# Patient Record
Sex: FEMALE | Race: WHITE | Employment: UNEMPLOYED | URBAN - METROPOLITAN AREA
[De-identification: names, ages, dates, MRNs, and addresses within clinical notes are randomized per-mention and may not be internally consistent; named-entity substitution may affect disease eponyms.]

---

## 2017-11-07 ENCOUNTER — APPOINTMENT (OUTPATIENT)
Dept: CT IMAGING | Age: 27
End: 2017-11-07
Attending: NURSE PRACTITIONER
Payer: MEDICAID

## 2017-11-07 ENCOUNTER — HOSPITAL ENCOUNTER (EMERGENCY)
Age: 27
Discharge: HOME | End: 2017-11-07
Attending: EMERGENCY MEDICINE | Admitting: EMERGENCY MEDICINE
Payer: MEDICAID

## 2017-11-07 VITALS
RESPIRATION RATE: 16 BRPM | OXYGEN SATURATION: 98 % | TEMPERATURE: 98 F | WEIGHT: 161.6 LBS | HEART RATE: 77 BPM | SYSTOLIC BLOOD PRESSURE: 117 MMHG | BODY MASS INDEX: 26.92 KG/M2 | HEIGHT: 65 IN | DIASTOLIC BLOOD PRESSURE: 77 MMHG

## 2017-11-07 DIAGNOSIS — K52.9 GASTROENTERITIS: Primary | ICD-10-CM

## 2017-11-07 LAB
ALBUMIN SERPL-MCNC: 4.2 G/DL (ref 3.5–5)
ALBUMIN/GLOB SERPL: 0.9 {RATIO} (ref 1.1–2.2)
ALP SERPL-CCNC: 88 U/L (ref 45–117)
ALT SERPL-CCNC: 72 U/L (ref 12–78)
ANION GAP SERPL CALC-SCNC: 9 MMOL/L (ref 5–15)
APPEARANCE UR: ABNORMAL
AST SERPL-CCNC: 30 U/L (ref 15–37)
BACTERIA URNS QL MICRO: ABNORMAL /HPF
BASOPHILS # BLD: 0 K/UL (ref 0–0.1)
BASOPHILS NFR BLD: 0 % (ref 0–1)
BILIRUB SERPL-MCNC: 0.6 MG/DL (ref 0.2–1)
BILIRUB UR QL: NEGATIVE
BUN SERPL-MCNC: 15 MG/DL (ref 6–20)
BUN/CREAT SERPL: 18 (ref 12–20)
CALCIUM SERPL-MCNC: 9.1 MG/DL (ref 8.5–10.1)
CHLORIDE SERPL-SCNC: 103 MMOL/L (ref 97–108)
CO2 SERPL-SCNC: 23 MMOL/L (ref 21–32)
COLOR UR: ABNORMAL
CREAT SERPL-MCNC: 0.83 MG/DL (ref 0.55–1.02)
EOSINOPHIL # BLD: 0.1 K/UL (ref 0–0.4)
EOSINOPHIL NFR BLD: 1 % (ref 0–7)
EPITH CASTS URNS QL MICRO: ABNORMAL /LPF
ERYTHROCYTE [DISTWIDTH] IN BLOOD BY AUTOMATED COUNT: 12.7 % (ref 11.5–14.5)
GLOBULIN SER CALC-MCNC: 4.8 G/DL (ref 2–4)
GLUCOSE SERPL-MCNC: 94 MG/DL (ref 65–100)
GLUCOSE UR STRIP.AUTO-MCNC: NEGATIVE MG/DL
HCG UR QL: NEGATIVE
HCT VFR BLD AUTO: 47.1 % (ref 35–47)
HGB BLD-MCNC: 16.3 G/DL (ref 11.5–16)
HGB UR QL STRIP: NEGATIVE
HYALINE CASTS URNS QL MICRO: ABNORMAL /LPF (ref 0–5)
KETONES UR QL STRIP.AUTO: NEGATIVE MG/DL
LEUKOCYTE ESTERASE UR QL STRIP.AUTO: ABNORMAL
LIPASE SERPL-CCNC: 95 U/L (ref 73–393)
LYMPHOCYTES # BLD: 1.8 K/UL (ref 0.8–3.5)
LYMPHOCYTES NFR BLD: 11 % (ref 12–49)
MCH RBC QN AUTO: 29.2 PG (ref 26–34)
MCHC RBC AUTO-ENTMCNC: 34.6 G/DL (ref 30–36.5)
MCV RBC AUTO: 84.4 FL (ref 80–99)
MONOCYTES # BLD: 0.6 K/UL (ref 0–1)
MONOCYTES NFR BLD: 4 % (ref 5–13)
NEUTS SEG # BLD: 13.4 K/UL (ref 1.8–8)
NEUTS SEG NFR BLD: 84 % (ref 32–75)
NITRITE UR QL STRIP.AUTO: NEGATIVE
PH UR STRIP: 5 [PH] (ref 5–8)
PLATELET # BLD AUTO: 324 K/UL (ref 150–400)
POTASSIUM SERPL-SCNC: 4.3 MMOL/L (ref 3.5–5.1)
PROT SERPL-MCNC: 9 G/DL (ref 6.4–8.2)
PROT UR STRIP-MCNC: NEGATIVE MG/DL
RBC # BLD AUTO: 5.58 M/UL (ref 3.8–5.2)
RBC #/AREA URNS HPF: ABNORMAL /HPF (ref 0–5)
SODIUM SERPL-SCNC: 135 MMOL/L (ref 136–145)
SP GR UR REFRACTOMETRY: 1.02 (ref 1–1.03)
T4 FREE SERPL-MCNC: 1 NG/DL (ref 0.8–1.5)
TSH SERPL DL<=0.05 MIU/L-ACNC: 2.94 UIU/ML (ref 0.36–3.74)
UR CULT HOLD, URHOLD: NORMAL
UROBILINOGEN UR QL STRIP.AUTO: 0.2 EU/DL (ref 0.2–1)
WBC # BLD AUTO: 15.9 K/UL (ref 3.6–11)
WBC URNS QL MICRO: ABNORMAL /HPF (ref 0–4)

## 2017-11-07 PROCEDURE — 84443 ASSAY THYROID STIM HORMONE: CPT | Performed by: NURSE PRACTITIONER

## 2017-11-07 PROCEDURE — 36415 COLL VENOUS BLD VENIPUNCTURE: CPT | Performed by: EMERGENCY MEDICINE

## 2017-11-07 PROCEDURE — 99283 EMERGENCY DEPT VISIT LOW MDM: CPT

## 2017-11-07 PROCEDURE — 74011636320 HC RX REV CODE- 636/320: Performed by: EMERGENCY MEDICINE

## 2017-11-07 PROCEDURE — 85025 COMPLETE CBC W/AUTO DIFF WBC: CPT | Performed by: EMERGENCY MEDICINE

## 2017-11-07 PROCEDURE — 80053 COMPREHEN METABOLIC PANEL: CPT | Performed by: EMERGENCY MEDICINE

## 2017-11-07 PROCEDURE — 81001 URINALYSIS AUTO W/SCOPE: CPT | Performed by: EMERGENCY MEDICINE

## 2017-11-07 PROCEDURE — 84439 ASSAY OF FREE THYROXINE: CPT | Performed by: NURSE PRACTITIONER

## 2017-11-07 PROCEDURE — 74011250636 HC RX REV CODE- 250/636: Performed by: NURSE PRACTITIONER

## 2017-11-07 PROCEDURE — 83690 ASSAY OF LIPASE: CPT | Performed by: EMERGENCY MEDICINE

## 2017-11-07 PROCEDURE — 74177 CT ABD & PELVIS W/CONTRAST: CPT

## 2017-11-07 PROCEDURE — 96372 THER/PROPH/DIAG INJ SC/IM: CPT

## 2017-11-07 PROCEDURE — 96361 HYDRATE IV INFUSION ADD-ON: CPT

## 2017-11-07 PROCEDURE — 74011000258 HC RX REV CODE- 258: Performed by: EMERGENCY MEDICINE

## 2017-11-07 PROCEDURE — 81025 URINE PREGNANCY TEST: CPT

## 2017-11-07 PROCEDURE — 96360 HYDRATION IV INFUSION INIT: CPT

## 2017-11-07 RX ORDER — SODIUM CHLORIDE 0.9 % (FLUSH) 0.9 %
10 SYRINGE (ML) INJECTION
Status: COMPLETED | OUTPATIENT
Start: 2017-11-07 | End: 2017-11-07

## 2017-11-07 RX ORDER — DICYCLOMINE HYDROCHLORIDE 10 MG/ML
20 INJECTION INTRAMUSCULAR
Status: COMPLETED | OUTPATIENT
Start: 2017-11-07 | End: 2017-11-07

## 2017-11-07 RX ORDER — DICYCLOMINE HYDROCHLORIDE 10 MG/1
10 CAPSULE ORAL 4 TIMES DAILY
Qty: 20 CAP | Refills: 0 | Status: SHIPPED | OUTPATIENT
Start: 2017-11-07 | End: 2017-11-12

## 2017-11-07 RX ORDER — AMITRIPTYLINE HYDROCHLORIDE 25 MG/1
25 TABLET, FILM COATED ORAL
COMMUNITY
End: 2019-01-08

## 2017-11-07 RX ORDER — ESCITALOPRAM OXALATE 20 MG/1
20 TABLET ORAL DAILY
COMMUNITY
End: 2019-01-08

## 2017-11-07 RX ORDER — ONDANSETRON 4 MG/1
4 TABLET, ORALLY DISINTEGRATING ORAL
Qty: 20 TAB | Refills: 0 | Status: SHIPPED | OUTPATIENT
Start: 2017-11-07 | End: 2019-01-08

## 2017-11-07 RX ADMIN — SODIUM CHLORIDE 100 ML: 900 INJECTION, SOLUTION INTRAVENOUS at 14:58

## 2017-11-07 RX ADMIN — SODIUM CHLORIDE 1000 ML: 900 INJECTION, SOLUTION INTRAVENOUS at 13:47

## 2017-11-07 RX ADMIN — IOPAMIDOL 100 ML: 755 INJECTION, SOLUTION INTRAVENOUS at 14:58

## 2017-11-07 RX ADMIN — SODIUM CHLORIDE 1000 ML: 900 INJECTION, SOLUTION INTRAVENOUS at 15:32

## 2017-11-07 RX ADMIN — Medication 10 ML: at 14:58

## 2017-11-07 RX ADMIN — DICYCLOMINE HYDROCHLORIDE 20 MG: 20 INJECTION, SOLUTION INTRAMUSCULAR at 13:48

## 2017-11-07 NOTE — ED TRIAGE NOTES
Pt. complains of abd. pain n/v. The first episode of this was about 3 weeks ago. Also complains of diarrhea. Has had blood work drawn by MD, has not had a sit down to discuss workup but was led to believe that she has hypothyroidism, tx for this has not started yet. Also states 2 weeks ago having fever and sore throat, was not seen or treated.

## 2017-11-07 NOTE — ED PROVIDER NOTES
HPI Comments: The patient is a 30-year-old female who presents ambulatory to the emergency with complaint of recurrent daily severe abdominal pain with associated nausea vomiting and diarrhea for the last 3 weeks. Symptoms have been most severe. Patient reports a 10-15 pound weight loss. 2 weeks ago she elicits a fever for several days with a MAXIMUM TEMPERATURE of 102. No positive sick contacts. No consumption of contaminated food. No history of abdominal or GI complications in the past. Seen by PCP and diagnosed with GERD, prescribed a PPI without improvement. Pt denies fevers, chills, night sweats, chest pain, pressure, SOB, AU, PND, orthopnea, melena, hematuria, dysuria, constipation, HA, dizziness, and syncope. No vaginal discharge, exposure to STI, or new sexual partner. Past Medical History:  No date: Hypothyroid  No date: Psychiatric disorder      Comment: depression, anxiety    Past Surgical History:  No date: HX BREAST AUGMENTATION  No date: HX GYN      Comment:   No date: HX LEEP PROCEDURE    PCP:  Yelena Gonzalez MD          Patient is a 28 y.o. female presenting with abdominal pain and vomiting. The history is provided by the patient. Abdominal Pain    This is a new problem. Episode onset: three weeks ago  The problem occurs daily. The problem has been gradually worsening. The pain is located in the periumbilical region. The pain is at a severity of 6/10. The pain is moderate. Associated symptoms include diarrhea, nausea and vomiting. Pertinent negatives include no fever, no constipation, no dysuria, no frequency, no hematuria, no headaches, no arthralgias, no myalgias, no chest pain and no back pain. Vomiting    Associated symptoms include abdominal pain and diarrhea. Pertinent negatives include no chills, no fever, no headaches, no arthralgias, no myalgias, no cough and no headaches.         Past Medical History:   Diagnosis Date    Hypothyroid     Psychiatric disorder     depression, anxiety       Past Surgical History:   Procedure Laterality Date    HX BREAST AUGMENTATION      HX GYN          HX LEEP PROCEDURE           History reviewed. No pertinent family history. Social History     Social History    Marital status: SINGLE     Spouse name: N/A    Number of children: N/A    Years of education: N/A     Occupational History    Not on file. Social History Main Topics    Smoking status: Former Smoker    Smokeless tobacco: Never Used    Alcohol use Yes    Drug use: No    Sexual activity: Not on file     Other Topics Concern    Not on file     Social History Narrative    No narrative on file         ALLERGIES: Review of patient's allergies indicates no known allergies. Review of Systems   Constitutional: Negative for activity change, appetite change, chills, diaphoresis, fatigue, fever and unexpected weight change. HENT: Negative for congestion, ear pain, rhinorrhea, sinus pressure, sore throat and tinnitus. Eyes: Negative for photophobia, pain, discharge and visual disturbance. Respiratory: Negative for apnea, cough, choking, chest tightness, shortness of breath, wheezing and stridor. Cardiovascular: Negative for chest pain, palpitations and leg swelling. Gastrointestinal: Positive for abdominal pain, diarrhea, nausea and vomiting. Negative for constipation. Endocrine: Negative for polydipsia, polyphagia and polyuria. Genitourinary: Negative for decreased urine volume, dyspareunia, dysuria, enuresis, flank pain, frequency, hematuria and urgency. Musculoskeletal: Negative for arthralgias, back pain, gait problem, myalgias and neck pain. Skin: Negative for color change, pallor, rash and wound. Allergic/Immunologic: Negative for immunocompromised state. Neurological: Negative for dizziness, seizures, syncope, weakness, light-headedness and headaches. Hematological: Does not bruise/bleed easily.    Psychiatric/Behavioral: Negative for agitation and confusion. The patient is not nervous/anxious. Vitals:    11/07/17 1310   BP: 106/76   Pulse: (!) 113   Resp: 18   Temp: 98.2 °F (36.8 °C)   SpO2: 97%   Weight: 73.3 kg (161 lb 9.6 oz)   Height: 5' 5\" (1.651 m)            Physical Exam   Constitutional: She is oriented to person, place, and time. She appears well-developed and well-nourished. No distress. HENT:   Head: Normocephalic. Right Ear: External ear normal.   Left Ear: External ear normal.   Mouth/Throat: Oropharynx is clear and moist. No oropharyngeal exudate. Eyes: Conjunctivae and EOM are normal. Pupils are equal, round, and reactive to light. Right eye exhibits no discharge. Left eye exhibits no discharge. No scleral icterus. Neck: Normal range of motion. Neck supple. No JVD present. No tracheal deviation present. No thyromegaly present. Cardiovascular: Normal rate, regular rhythm, normal heart sounds, intact distal pulses and normal pulses. Exam reveals no gallop and no friction rub. No murmur heard. Pulmonary/Chest: Effort normal and breath sounds normal. No accessory muscle usage or stridor. No respiratory distress. She has no decreased breath sounds. She has no wheezes. She has no rhonchi. She has no rales. She exhibits no tenderness. Abdominal: Soft. Bowel sounds are normal. She exhibits no distension and no mass. There is no hepatosplenomegaly. There is tenderness in the periumbilical area. There is no rigidity, no rebound, no guarding, no CVA tenderness, no tenderness at McBurney's point and negative Mcelroy's sign. Musculoskeletal: Normal range of motion. She exhibits no edema or tenderness. Lymphadenopathy:     She has no cervical adenopathy. Neurological: She is alert and oriented to person, place, and time. She displays normal reflexes. No cranial nerve deficit or sensory deficit. GCS eye subscore is 4. GCS verbal subscore is 5. GCS motor subscore is 6. Skin: Skin is warm and dry. No rash noted.  She is not diaphoretic. No erythema. No pallor. Psychiatric: She has a normal mood and affect. Her behavior is normal. Judgment and thought content normal.   Nursing note and vitals reviewed. MDM  Number of Diagnoses or Management Options  Diagnosis management comments:    * routine laboratory data and UA   * IVF and bentyl   * CT abd pelvis       Amount and/or Complexity of Data Reviewed  Clinical lab tests: ordered and reviewed  Tests in the radiology section of CPT®: ordered and reviewed  Discussion of test results with the performing providers: yes  Review and summarize past medical records: yes  Discuss the patient with other providers: yes    Risk of Complications, Morbidity, and/or Mortality  General comments:    - stable, ambulatory pt in NAD    Patient Progress  Patient progress: stable    ED Course       Procedures           4:15 PM  Pt has been reevaluated. There are no new complaints, changes, or physical findings at this time. Medications have been reviewed w/ pt and/or family. Pt and/or family's questions have been answered. Pt and/or family expressed good understanding of the dx/tx/rx and is in agreement with plan of care. Pt instructed and agreed to f/u w/ PCP and GI and to return to ED upon further deterioration. Pt is ready for discharge. LABORATORY TESTS:  Recent Results (from the past 12 hour(s))   CBC WITH AUTOMATED DIFF    Collection Time: 11/07/17  1:17 PM   Result Value Ref Range    WBC 15.9 (H) 3.6 - 11.0 K/uL    RBC 5.58 (H) 3.80 - 5.20 M/uL    HGB 16.3 (H) 11.5 - 16.0 g/dL    HCT 47.1 (H) 35.0 - 47.0 %    MCV 84.4 80.0 - 99.0 FL    MCH 29.2 26.0 - 34.0 PG    MCHC 34.6 30.0 - 36.5 g/dL    RDW 12.7 11.5 - 14.5 %    PLATELET 261 274 - 273 K/uL    NEUTROPHILS 84 (H) 32 - 75 %    LYMPHOCYTES 11 (L) 12 - 49 %    MONOCYTES 4 (L) 5 - 13 %    EOSINOPHILS 1 0 - 7 %    BASOPHILS 0 0 - 1 %    ABS. NEUTROPHILS 13.4 (H) 1.8 - 8.0 K/UL    ABS. LYMPHOCYTES 1.8 0.8 - 3.5 K/UL    ABS.  MONOCYTES 0.6 0.0 - 1.0 K/UL    ABS. EOSINOPHILS 0.1 0.0 - 0.4 K/UL    ABS. BASOPHILS 0.0 0.0 - 0.1 K/UL   METABOLIC PANEL, COMPREHENSIVE    Collection Time: 11/07/17  1:17 PM   Result Value Ref Range    Sodium 135 (L) 136 - 145 mmol/L    Potassium 4.3 3.5 - 5.1 mmol/L    Chloride 103 97 - 108 mmol/L    CO2 23 21 - 32 mmol/L    Anion gap 9 5 - 15 mmol/L    Glucose 94 65 - 100 mg/dL    BUN 15 6 - 20 MG/DL    Creatinine 0.83 0.55 - 1.02 MG/DL    BUN/Creatinine ratio 18 12 - 20      GFR est AA >60 >60 ml/min/1.73m2    GFR est non-AA >60 >60 ml/min/1.73m2    Calcium 9.1 8.5 - 10.1 MG/DL    Bilirubin, total 0.6 0.2 - 1.0 MG/DL    ALT (SGPT) 72 12 - 78 U/L    AST (SGOT) 30 15 - 37 U/L    Alk.  phosphatase 88 45 - 117 U/L    Protein, total 9.0 (H) 6.4 - 8.2 g/dL    Albumin 4.2 3.5 - 5.0 g/dL    Globulin 4.8 (H) 2.0 - 4.0 g/dL    A-G Ratio 0.9 (L) 1.1 - 2.2     LIPASE    Collection Time: 11/07/17  1:17 PM   Result Value Ref Range    Lipase 95 73 - 393 U/L   TSH 3RD GENERATION    Collection Time: 11/07/17  1:17 PM   Result Value Ref Range    TSH 2.94 0.36 - 3.74 uIU/mL   T4, FREE    Collection Time: 11/07/17  1:17 PM   Result Value Ref Range    T4, Free 1.0 0.8 - 1.5 NG/DL   URINALYSIS W/MICROSCOPIC    Collection Time: 11/07/17  1:25 PM   Result Value Ref Range    Color YELLOW/STRAW      Appearance CLOUDY (A) CLEAR      Specific gravity 1.025 1.003 - 1.030      pH (UA) 5.0 5.0 - 8.0      Protein NEGATIVE  NEG mg/dL    Glucose NEGATIVE  NEG mg/dL    Ketone NEGATIVE  NEG mg/dL    Bilirubin NEGATIVE  NEG      Blood NEGATIVE  NEG      Urobilinogen 0.2 0.2 - 1.0 EU/dL    Nitrites NEGATIVE  NEG      Leukocyte Esterase TRACE (A) NEG      WBC 5-10 0 - 4 /hpf    RBC 5-10 0 - 5 /hpf    Epithelial cells MODERATE (A) FEW /lpf    Bacteria 1+ (A) NEG /hpf    Hyaline cast 2-5 0 - 5 /lpf   URINE CULTURE HOLD SAMPLE    Collection Time: 11/07/17  1:25 PM   Result Value Ref Range    Urine culture hold URINE ON HOLD IN MICROBIOLOGY DEPT FOR 3 DAYS     HCG URINE, QL. - POC    Collection Time: 11/07/17  1:31 PM   Result Value Ref Range    Pregnancy test,urine (POC) NEGATIVE  NEG         IMAGING RESULTS:  CT ABD PELV W CONT   Final Result        Ct Abd Pelv W Cont    Result Date: 11/7/2017  EXAM:  CT ABD PELV W CONT INDICATION: 3 week history of abdominal pain, nausea and vomiting. Also has diarrhea. Reports 10-50 pound weight loss. Several day history of fever with maximum temperature 102. COMPARISON: None CONTRAST:  100 mL of Isovue-370. TECHNIQUE: Following the uneventful intravenous administration of contrast, thin axial images were obtained through the abdomen and pelvis. Coronal and sagittal reconstructions were generated. Oral contrast was not, per order of the ordering physician, administered. CT dose reduction was achieved through use of a standardized protocol tailored for this examination and automatic exposure control for dose modulation. The lack of oral contrast material diminishes the capacity of CT to evaluate abnormalities of the bowel. FINDINGS: LUNG BASES: Clear. INCIDENTALLY IMAGED HEART AND MEDIASTINUM: Unremarkable. LIVER: Hepatic steatosis. No hepatic mass or biliary ductal dilation. GALLBLADDER: Unremarkable. SPLEEN: No mass. PANCREAS: No mass or ductal dilatation. ADRENALS: Unremarkable. KIDNEYS: No mass, calculus, or hydronephrosis. STOMACH: Unremarkable. SMALL BOWEL: No dilatation or wall thickening. COLON: No dilatation or wall thickening. APPENDIX: Unremarkable. PERITONEUM: No ascites or pneumoperitoneum. RETROPERITONEUM: No lymphadenopathy or aortic aneurysm. REPRODUCTIVE ORGANS: Unremarkable. URINARY BLADDER: No mass or calculus. BONES: No destructive bone lesion. ADDITIONAL COMMENTS: N/A     IMPRESSION: No acute findings. Hepatic steatosis.          MEDICATIONS GIVEN:  Medications   sodium chloride 0.9 % bolus infusion 1,000 mL (0 mL IntraVENous IV Completed 11/7/17 7422)   dicyclomine (BENTYL) 10 mg/mL injection 20 mg (20 mg IntraMUSCular Given 11/7/17 1348)   sodium chloride 0.9 % bolus infusion 100 mL (0 mL IntraVENous IV Completed 11/7/17 1532)   iopamidol (ISOVUE-370) 76 % injection 100 mL (100 mL IntraVENous Given 11/7/17 1458)   sodium chloride (NS) flush 10 mL (10 mL IntraVENous Given 11/7/17 1458)   sodium chloride 0.9 % bolus infusion 1,000 mL (0 mL IntraVENous IV Completed 11/7/17 1607)       IMPRESSION:  1. Gastroenteritis        PLAN:  1. Discharge Medication List as of 11/7/2017  4:01 PM      START taking these medications    Details   ondansetron (ZOFRAN ODT) 4 mg disintegrating tablet Take 1 Tab by mouth every eight (8) hours as needed for Nausea. , Print, Disp-20 Tab, R-0      dicyclomine (BENTYL) 10 mg capsule Take 1 Cap by mouth four (4) times daily for 5 days. , Print, Disp-20 Cap, R-0         CONTINUE these medications which have NOT CHANGED    Details   escitalopram oxalate (LEXAPRO) 20 mg tablet Take 20 mg by mouth daily. , Historical Med      amitriptyline (ELAVIL) 25 mg tablet Take 25 mg by mouth nightly., Historical Med           2. Follow-up Information     Follow up With Details Comments [address], [place] In 2 days  [facility]  [address] [place] [facility]  As needed, If symptoms worsen [facility]  [phone]        3.  Supportive care     Return to ED if worse       Natasha Jordan NP  4:15 PM

## 2017-11-07 NOTE — DISCHARGE INSTRUCTIONS
Gastroenteritis: Care Instructions  Your Care Instructions    Gastroenteritis is an illness that may cause nausea, vomiting, and diarrhea. It is sometimes called \"stomach flu. \" It can be caused by bacteria or a virus. You will probably begin to feel better in 1 to 2 days. In the meantime, get plenty of rest and make sure you do not become dehydrated. Dehydration occurs when your body loses too much fluid. Follow-up care is a key part of your treatment and safety. Be sure to make and go to all appointments, and call your doctor if you are having problems. It's also a good idea to know your test results and keep a list of the medicines you take. How can you care for yourself at home? · If your doctor prescribed antibiotics, take them as directed. Do not stop taking them just because you feel better. You need to take the full course of antibiotics. · Drink plenty of fluids to prevent dehydration, enough so that your urine is light yellow or clear like water. Choose water and other caffeine-free clear liquids until you feel better. If you have kidney, heart, or liver disease and have to limit fluids, talk with your doctor before you increase your fluid intake. · Drink fluids slowly, in frequent, small amounts, because drinking too much too fast can cause vomiting. · Begin eating mild foods, such as dry toast, yogurt, applesauce, bananas, and rice. Avoid spicy, hot, or high-fat foods, and do not drink alcohol or caffeine for a day or two. Do not drink milk or eat ice cream until you are feeling better. How to prevent gastroenteritis  · Keep hot foods hot and cold foods cold. · Do not eat meats, dressings, salads, or other foods that have been kept at room temperature for more than 2 hours. · Use a thermometer to check your refrigerator. It should be between 34°F and 40°F.  · Defrost meats in the refrigerator or microwave, not on the kitchen counter. · Keep your hands and your kitchen clean.  Wash your hands, cutting boards, and countertops with hot soapy water frequently. · Cook meat until it is well done. · Do not eat raw eggs or uncooked sauces made with raw eggs. · Do not take chances. If food looks or tastes spoiled, throw it out. When should you call for help? Call 911 anytime you think you may need emergency care. For example, call if:  ? · You vomit blood or what looks like coffee grounds. ? · You passed out (lost consciousness). ? · You pass maroon or very bloody stools. ?Call your doctor now or seek immediate medical care if:  ? · You have severe belly pain. ? · You have signs of needing more fluids. You have sunken eyes, a dry mouth, and pass only a little dark urine. ? · You feel like you are going to faint. ? · You have increased belly pain that does not go away in 1 to 2 days. ? · You have new or increased nausea, or you are vomiting. ? · You have a new or higher fever. ? · Your stools are black and tarlike or have streaks of blood. ? Watch closely for changes in your health, and be sure to contact your doctor if:  ? · You are dizzy or lightheaded. ? · You urinate less than usual, or your urine is dark yellow or brown. ? · You do not feel better with each day that goes by. Where can you learn more? Go to http://bunny-aldo.info/. Enter N142 in the search box to learn more about \"Gastroenteritis: Care Instructions. \"  Current as of: March 3, 2017  Content Version: 11.4  © 5450-1180 [employer]. Care instructions adapted under license by [employer] (which disclaims liability or warranty for this information). If you have questions about a medical condition or this instruction, always ask your healthcare professional. [address] any warranty or liability for your use of this information. We hope that we have addressed all of your medical concerns.  The examination and treatment you received in the Emergency Department were for an emergent problem and were not intended as complete care. It is important that you follow up with your healthcare provider(s) for ongoing care. If your symptoms worsen or do not improve as expected, and you are unable to reach your usual health care provider(s), you should return to the Emergency Department. Today's healthcare is undergoing tremendous change, and patient satisfaction surveys are one of the many tools to assess the quality of medical care. You may receive a survey from the [employer] regarding your experience in the Emergency Department. I hope that your experience has been completely positive, particularly the medical care that I provided. As such, please participate in the survey; anything less than excellent does not meet my expectations or intentions. [facility] and [facility] participate in nationally recognized quality of care measures. If your blood pressure is greater than 120/80, as reported below, we urge that you seek medical care to address the potential of high blood pressure, commonly known as hypertension. Hypertension can be hereditary or can be caused by certain medical conditions, pain, stress, or \"white coat syndrome. \"       Please make an appointment with your health care provider(s) for follow up of your Emergency Department visit. VITALS:   Patient Vitals for the past 8 hrs:   Temp Pulse Resp BP SpO2   11/07/17 1310 98.2 °F (36.8 °C) (!) 113 18 106/76 97 %          Thank you for allowing us to provide you with medical care today. We realize that you have many choices for your emergency care needs. Please choose us in the future for any continued health care needs. [facility], [employer].   Office: [phone]            Recent Results (from the past 24 hour(s))   CBC WITH AUTOMATED DIFF    Collection Time: 11/07/17  1:17 PM   Result Value Ref Range    WBC 15.9 (H) 3.6 - 11.0 K/uL    RBC 5.58 (H) 3.80 - 5.20 M/uL    HGB 16.3 (H) 11.5 - 16.0 g/dL    HCT 47.1 (H) 35.0 - 47.0 %    MCV 84.4 80.0 - 99.0 FL    MCH 29.2 26.0 - 34.0 PG    MCHC 34.6 30.0 - 36.5 g/dL    RDW 12.7 11.5 - 14.5 %    PLATELET 144 596 - 697 K/uL    NEUTROPHILS 84 (H) 32 - 75 %    LYMPHOCYTES 11 (L) 12 - 49 %    MONOCYTES 4 (L) 5 - 13 %    EOSINOPHILS 1 0 - 7 %    BASOPHILS 0 0 - 1 %    ABS. NEUTROPHILS 13.4 (H) 1.8 - 8.0 K/UL    ABS. LYMPHOCYTES 1.8 0.8 - 3.5 K/UL    ABS. MONOCYTES 0.6 0.0 - 1.0 K/UL    ABS. EOSINOPHILS 0.1 0.0 - 0.4 K/UL    ABS. BASOPHILS 0.0 0.0 - 0.1 K/UL   METABOLIC PANEL, COMPREHENSIVE    Collection Time: 11/07/17  1:17 PM   Result Value Ref Range    Sodium 135 (L) 136 - 145 mmol/L    Potassium 4.3 3.5 - 5.1 mmol/L    Chloride 103 97 - 108 mmol/L    CO2 23 21 - 32 mmol/L    Anion gap 9 5 - 15 mmol/L    Glucose 94 65 - 100 mg/dL    BUN 15 6 - 20 MG/DL    Creatinine 0.83 0.55 - 1.02 MG/DL    BUN/Creatinine ratio 18 12 - 20      GFR est AA >60 >60 ml/min/1.73m2    GFR est non-AA >60 >60 ml/min/1.73m2    Calcium 9.1 8.5 - 10.1 MG/DL    Bilirubin, total 0.6 0.2 - 1.0 MG/DL    ALT (SGPT) 72 12 - 78 U/L    AST (SGOT) 30 15 - 37 U/L    Alk.  phosphatase 88 45 - 117 U/L    Protein, total 9.0 (H) 6.4 - 8.2 g/dL    Albumin 4.2 3.5 - 5.0 g/dL    Globulin 4.8 (H) 2.0 - 4.0 g/dL    A-G Ratio 0.9 (L) 1.1 - 2.2     LIPASE    Collection Time: 11/07/17  1:17 PM   Result Value Ref Range    Lipase 95 73 - 393 U/L   TSH 3RD GENERATION    Collection Time: 11/07/17  1:17 PM   Result Value Ref Range    TSH 2.94 0.36 - 3.74 uIU/mL   T4, FREE    Collection Time: 11/07/17  1:17 PM   Result Value Ref Range    T4, Free 1.0 0.8 - 1.5 NG/DL   URINALYSIS W/MICROSCOPIC    Collection Time: 11/07/17  1:25 PM   Result Value Ref Range    Color YELLOW/STRAW      Appearance CLOUDY (A) CLEAR      Specific gravity 1.025 1.003 - 1.030      pH (UA) 5.0 5.0 - 8.0 Protein NEGATIVE  NEG mg/dL    Glucose NEGATIVE  NEG mg/dL    Ketone NEGATIVE  NEG mg/dL    Bilirubin NEGATIVE  NEG      Blood NEGATIVE  NEG      Urobilinogen 0.2 0.2 - 1.0 EU/dL    Nitrites NEGATIVE  NEG      Leukocyte Esterase TRACE (A) NEG      WBC 5-10 0 - 4 /hpf    RBC 5-10 0 - 5 /hpf    Epithelial cells MODERATE (A) FEW /lpf    Bacteria 1+ (A) NEG /hpf    Hyaline cast 2-5 0 - 5 /lpf   URINE CULTURE HOLD SAMPLE    Collection Time: 11/07/17  1:25 PM   Result Value Ref Range    Urine culture hold URINE ON HOLD IN MICROBIOLOGY DEPT FOR 3 DAYS     HCG URINE, QL. - POC    Collection Time: 11/07/17  1:31 PM   Result Value Ref Range    Pregnancy test,urine (POC) NEGATIVE  NEG         Ct Abd Pelv W Cont    Result Date: 11/7/2017  EXAM:  CT ABD PELV W CONT INDICATION: 3 week history of abdominal pain, nausea and vomiting. Also has diarrhea. Reports 10-50 pound weight loss. Several day history of fever with maximum temperature 102. COMPARISON: None CONTRAST:  100 mL of Isovue-370. TECHNIQUE: Following the uneventful intravenous administration of contrast, thin axial images were obtained through the abdomen and pelvis. Coronal and sagittal reconstructions were generated. Oral contrast was not, per order of the ordering physician, administered. CT dose reduction was achieved through use of a standardized protocol tailored for this examination and automatic exposure control for dose modulation. The lack of oral contrast material diminishes the capacity of CT to evaluate abnormalities of the bowel. FINDINGS: LUNG BASES: Clear. INCIDENTALLY IMAGED HEART AND MEDIASTINUM: Unremarkable. LIVER: Hepatic steatosis. No hepatic mass or biliary ductal dilation. GALLBLADDER: Unremarkable. SPLEEN: No mass. PANCREAS: No mass or ductal dilatation. ADRENALS: Unremarkable. KIDNEYS: No mass, calculus, or hydronephrosis. STOMACH: Unremarkable. SMALL BOWEL: No dilatation or wall thickening. COLON: No dilatation or wall thickening.  APPENDIX: Unremarkable. PERITONEUM: No ascites or pneumoperitoneum. RETROPERITONEUM: No lymphadenopathy or aortic aneurysm. REPRODUCTIVE ORGANS: Unremarkable. URINARY BLADDER: No mass or calculus. BONES: No destructive bone lesion. ADDITIONAL COMMENTS: N/A     IMPRESSION: No acute findings. Hepatic steatosis.

## 2019-01-03 ENCOUNTER — HOSPITAL ENCOUNTER (EMERGENCY)
Age: 29
Discharge: LEFT BEFORE DISCHARGE | End: 2019-01-03
Attending: STUDENT IN AN ORGANIZED HEALTH CARE EDUCATION/TRAINING PROGRAM
Payer: COMMERCIAL

## 2019-01-03 VITALS — OXYGEN SATURATION: 100 %

## 2019-01-03 PROCEDURE — 75810000275 HC EMERGENCY DEPT VISIT NO LEVEL OF CARE

## 2019-01-08 ENCOUNTER — TELEPHONE (OUTPATIENT)
Dept: FAMILY MEDICINE CLINIC | Age: 29
End: 2019-01-08

## 2019-01-08 ENCOUNTER — OFFICE VISIT (OUTPATIENT)
Dept: FAMILY MEDICINE CLINIC | Age: 29
End: 2019-01-08

## 2019-01-08 VITALS
SYSTOLIC BLOOD PRESSURE: 102 MMHG | HEIGHT: 65 IN | WEIGHT: 122 LBS | BODY MASS INDEX: 20.33 KG/M2 | OXYGEN SATURATION: 97 % | RESPIRATION RATE: 18 BRPM | HEART RATE: 108 BPM | TEMPERATURE: 98 F | DIASTOLIC BLOOD PRESSURE: 70 MMHG

## 2019-01-08 DIAGNOSIS — R11.10 VOMITING AND DIARRHEA: Primary | ICD-10-CM

## 2019-01-08 DIAGNOSIS — F32.A DEPRESSION, UNSPECIFIED DEPRESSION TYPE: ICD-10-CM

## 2019-01-08 DIAGNOSIS — F41.0 PANIC DISORDER: ICD-10-CM

## 2019-01-08 DIAGNOSIS — R19.7 VOMITING AND DIARRHEA: Primary | ICD-10-CM

## 2019-01-08 DIAGNOSIS — N92.1 MENORRHAGIA WITH IRREGULAR CYCLE: ICD-10-CM

## 2019-01-08 DIAGNOSIS — Z23 ENCOUNTER FOR IMMUNIZATION: ICD-10-CM

## 2019-01-08 RX ORDER — BUSPIRONE HYDROCHLORIDE 15 MG/1
15 TABLET ORAL 3 TIMES DAILY
Qty: 90 TAB | Refills: 1 | Status: SHIPPED | OUTPATIENT
Start: 2019-01-08 | End: 2019-02-14 | Stop reason: SDUPTHER

## 2019-01-08 RX ORDER — SERTRALINE HYDROCHLORIDE 50 MG/1
50 TABLET, FILM COATED ORAL DAILY
Qty: 30 TAB | Refills: 1 | Status: SHIPPED | OUTPATIENT
Start: 2019-01-08 | End: 2019-02-14

## 2019-01-08 RX ORDER — ALPRAZOLAM 0.25 MG/1
0.25 TABLET ORAL
Qty: 10 TAB | Refills: 0 | Status: SHIPPED | OUTPATIENT
Start: 2019-01-08

## 2019-01-08 NOTE — PATIENT INSTRUCTIONS
Start sertraline (zoloft) 1/2 pill daily for 2 weeks (25mg daily)  Then go up to 1 pill daily (50mg)  Go back on buspirone 15mg 3x daily  Use the xanax as little as possible, but it if helps you work, great  We should see you back in 3-4 weeks to see your progress  I think you will need to stay on the zoloft long-term as the panic attacks have been a recurrent problem for you         Recovering From Depression: Care Instructions  Your Care Instructions    Taking good care of yourself is important as you recover from depression. In time, your symptoms will fade as your treatment takes hold. Do not give up. Instead, focus your energy on getting better. Your mood will improve. It just takes some time. Focus on things that can help you feel better, such as being with friends and family, eating well, and getting enough rest. But take things slowly. Do not do too much too soon. You will begin to feel better gradually. Follow-up care is a key part of your treatment and safety. Be sure to make and go to all appointments, and call your doctor if you are having problems. It's also a good idea to know your test results and keep a list of the medicines you take. How can you care for yourself at home? Be realistic  · If you have a large task to do, break it up into smaller steps you can handle, and just do what you can. · You may want to put off important decisions until your depression has lifted. If you have plans that will have a major impact on your life, such as marriage, divorce, or a job change, try to wait a bit. Talk it over with friends and loved ones who can help you look at the overall picture first.  · Reaching out to people for help is important. Do not isolate yourself. Let your family and friends help you. Find someone you can trust and confide in, and talk to that person. · Be patient, and be kind to yourself.  Remember that depression is not your fault and is not something you can overcome with willpower alone. Treatment is necessary for depression, just like for any other illness. Feeling better takes time, and your mood will improve little by little. Stay active  · Stay busy and get outside. Take a walk, or try some other light exercise. · Talk with your doctor about an exercise program. Exercise can help with mild depression. · Go to a movie or concert. Take part in a Judaism activity or other social gathering. Go to a ball game. · Ask a friend to have dinner with you. Take care of yourself  · Eat a balanced diet with plenty of fresh fruits and vegetables, whole grains, and lean protein. If you have lost your appetite, eat small snacks rather than large meals. · Avoid drinking alcohol or using illegal drugs. Do not take medicines that have not been prescribed for you. They may interfere with medicines you may be taking for depression, or they may make your depression worse. · Take your medicines exactly as they are prescribed. You may start to feel better within 1 to 3 weeks of taking antidepressant medicine. But it can take as many as 6 to 8 weeks to see more improvement. If you have questions or concerns about your medicines, or if you do not notice any improvement by 3 weeks, talk to your doctor. · If you have any side effects from your medicine, tell your doctor. Antidepressants can make you feel tired, dizzy, or nervous. Some people have dry mouth, constipation, headaches, sexual problems, or diarrhea. Many of these side effects are mild and will go away on their own after you have been taking the medicine for a few weeks. Some may last longer. Talk to your doctor if side effects are bothering you too much. You might be able to try a different medicine. · Get enough sleep. If you have problems sleeping:  ? Go to bed at the same time every night, and get up at the same time every morning. ? Keep your bedroom dark and quiet. ? Do not exercise after 5:00 p.m.  ?  Avoid drinks with caffeine after 5:00 p.m.  · Avoid sleeping pills unless they are prescribed by the doctor treating your depression. Sleeping pills may make you groggy during the day, and they may interact with other medicine you are taking. · If you have any other illnesses, such as diabetes, heart disease, or high blood pressure, make sure to continue with your treatment. Tell your doctor about all of the medicines you take, including those with or without a prescription. · Keep the numbers for these national suicide hotlines: 2-175-297-TALK (4-687.920.1582) and 7-469-FRXLRIC (1-661.660.7194). If you or someone you know talks about suicide or feeling hopeless, get help right away. When should you call for help? Call 911 anytime you think you may need emergency care. For example, call if:    · You feel like hurting yourself or someone else.     · Someone you know has depression and is about to attempt or is attempting suicide.   [facility] your doctor now or seek immediate medical care if:    · You hear voices.     · Someone you know has depression and:  ? Starts to give away his or her possessions. ? Uses illegal drugs or drinks alcohol heavily. ? Talks or writes about death, including writing suicide notes or talking about guns, knives, or pills. ? Starts to spend a lot of time alone. ? Acts very aggressively or suddenly appears calm.    Watch closely for changes in your health, and be sure to contact your doctor if:    · You do not get better as expected. Where can you learn more? Go to http://bunny-aldo.info/. Enter B082 in the search box to learn more about \"Recovering From Depression: Care Instructions. \"  Current as of: December 7, 2017  Content Version: 11.8  © 0165-4317 [employer]. Care instructions adapted under license by [employer] (which disclaims liability or warranty for this information).  If you have questions about a medical condition or this instruction, always ask your healthcare professional. [address] any warranty or liability for your use of this information.

## 2019-01-08 NOTE — PROGRESS NOTES
Chief Complaint   Patient presents with    New Patient     establish care        1. Have you been to the ER, urgent care clinic since your last visit? Hospitalized since your last visit? Yes. [facility] 11/17 and last week for anxiety/depression     2. Have you seen or consulted any other health care providers outside of the [address] [place] [address] since your last visit? Include any pap smears or colon screening.  No

## 2019-01-08 NOTE — TELEPHONE ENCOUNTER
Another TE has been opened regarding this same issue. Pt is calling a different pharmacy to see if they have the buspar.

## 2019-01-08 NOTE — PROGRESS NOTES
[facility]  [place]. [place], [address]  [phone]             Date of visit: 1/8/2019   Subjective:      History obtained from:  the patient. Susie Schneider is a 33 y.o. female who presents today for new patient, moved to area about 2 years ago  Has had vomiting, diarrhea, headaches, confusion, she thinks all from depression/panic  Irritability  Has been a recurring problem   Had severe postpartum depression that wasn't treated  About a year ago got really bad, physically sick, got labs at ER  Switched to vegan diet and taht elevated her mood to be able to work out and was feeling better, thought she was ok but now she raelizes the never really recovered  In the summer she started to have panic attacks, at that time she was excited about a new full time job but couldn't do it because of panic attacks  Went to [facility], they changed her lexapro to zoloft 100mg and added buspar 15mg, started to feel a little better but not all the way  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     Lives with boyfriend  Stay at home mom, has a part time job she really loves but can't work now due to depression/panic, really hopeful she doesn't lose the job      Now that she has medicaid trying to get appt with psychiatrist and therapist  Would like to see a therapist    LMP 1/1 so doesn't think she is pregnant  Not on birth control now  Has taken something in the past  Periods cause uncomfortable bloating and are heavy  Little sporadic but still about 1 per month    Last pap was when she was pregnant  History of LEEP that worked    There are no active problems to display for this patient. Current Outpatient Medications   Medication Sig Dispense Refill    sertraline (ZOLOFT) 50 mg tablet Take 1 Tab by mouth daily. 30 Tab 1    busPIRone (BUSPAR) 15 mg tablet Take 1 Tab by mouth three (3) times daily. 90 Tab 1    ALPRAZolam (XANAX) 0.25 mg tablet Take 1 Tab by mouth two (2) times daily as needed for Anxiety. Max Daily Amount: 0.5 mg. 10 Tab 0    norethindrone-e estradiol-iron (LOESTRIN FE) 1 mg-20 mcg (24)/75 mg (4) tab Take 1 Tab by mouth daily.  28 Tab 12     No Known Allergies  Past Medical History:   Diagnosis Date    Hypothyroid     Psychiatric disorder     depression, anxiety     Past Surgical History:   Procedure Laterality Date    HX BREAST AUGMENTATION      HX GYN          HX LEEP PROCEDURE       Family History   Problem Relation Age of Onset    Diabetes Mother     Heart Disease Paternal Grandmother     Heart Disease Paternal Grandfather      Social History     Tobacco Use    Smoking status: Former Smoker    Smokeless tobacco: Never Used   Substance Use Topics    Alcohol use: No     Frequency: Never     Comment: used to self-medicate, drank too much      Social History     Social History Narrative    Lives with boyfriend and daughter Malissa Gallegos born in 2016    Really enjoys working out, works part time as         Review of Systems  Gen: denies fever  ENT denies sinus problems  Neuro: admits to headaches when stressed  All: denies allergies  CV admits to palpitations during panic attacks  Pulm: admits to sob during panic attacks  GI: denies hematochezia  MSK: denies joint pains  Psych: admits to some brief SI but never a plan, would never do anything, knows the SI are abnormal   denies missed menses Objective:     Vitals:    01/08/19 0916   BP: 102/70   Pulse: (!) 108   Resp: 18   Temp: 98 °F (36.7 °C)   TempSrc: Oral   SpO2: 97%   Weight: 122 lb (55.3 kg)   Height: 5' 5\" (1.651 m)     Body mass index is 20.3 kg/m². General: stated age, well-developed, and in NAD  Eyes: PERRL, EOMI, no redness or drainage  Nose: no drainage  Mouth: no lesions  Throat: no erythema, exudate or swelling  Neck: supple, symmetrical, trachea midline, no adenopathy and thyroid: not enlarged, symmetric, no tenderness/mass/nodules  Lungs:  clear to auscultation w/o rales, rhonchi, wheezes w/normal effort and no use of accessory muscles of respiration   Heart: regular rate and rhythm, S1, S2 normal, no murmur, click, rub or gallop  Abdomen: soft, nontender, no masses  Ext:  No edema noted. Lymph: no cervical adenopathy appreciated  Skin:  Normal. and no rash or abnormalities   Neuro: normal gait, CN 2-12 intact  Psych: alert and oriented to person, place, time and situation and Speech: appropriate quality, quantity and organization of sentences and mildly depressed affect    Assessment/Plan:       ICD-10-CM ICD-9-CM    1. Vomiting and diarrhea R11.10 787.03     R19.7 787.91    2. Depression, unspecified depression type F32.9 311 REFERRAL TO SOCIAL WORK   3. Panic disorder F41.0 300.01 REFERRAL TO SOCIAL WORK      ALPRAZolam (XANAX) 0.25 mg tablet   4. Menorrhagia with irregular cycle N92.1 626.2    5.  Encounter for immunization Z23 V03.89 INFLUENZA VIRUS VAC QUAD,SPLIT,PRESV FREE SYRINGE IM      NE IMMUNIZ ADMIN,1 SINGLE/COMB VAC/TOXOID        Orders Placed This Encounter    Influenza virus vaccine (QUADRIVALENT PRES FREE SYRINGE) IM (98653)    REFERRAL TO SOCIAL WORK    NE IMMUNIZ ADMIN,1 SINGLE/COMB VAC/TOXOID    sertraline (ZOLOFT) 50 mg tablet    busPIRone (BUSPAR) 15 mg tablet    ALPRAZolam (XANAX) 0.25 mg tablet    norethindrone-e estradiol-iron (LOESTRIN FE) 1 mg-20 mcg (24)/75 mg (4) tab       She really thinks all the physical symptoms are caused by the panic attacks, and I think she is probably right  Will not repeat labs that were normal last year  Start back on treatment that helped in the past  encouraged her to also continue the regular exercise  She is also very interested in seeing a therapist regularly and will see our in-house therapist Kristine Strauss  She is no longer using alcohol, told her I thought that was a good idea  If physical symptoms don't resolve with treatment of the panic and depression may need further work up  Also starting OCPs to help her periods as well as for birth control, has tolerated them well in the past    Patient Instructions     Start sertraline (zoloft) 1/2 pill daily for 2 weeks (25mg daily)  Then go up to 1 pill daily (50mg)  Go back on buspirone 15mg 3x daily  Use the xanax as little as possible, but it if helps you work, great  We should see you back in 3-4 weeks to see your progress  I think you will need to stay on the zoloft long-term as the panic attacks have been a recurrent problem for you    Discussed the diagnosis and plan and she expressed understanding. Follow-up Disposition:  Return in about 4 weeks (around 2/5/2019) for Follow up.  and deedee Mora MD

## 2019-01-08 NOTE — TELEPHONE ENCOUNTER
Pharmacy requesting medication refill/ alternative     Current medication is on national backorder for all strengths for several months.    busPIRone (BUSPAR) 15 mg tablet     Pharmacy on file verified

## 2019-01-09 ENCOUNTER — OFFICE VISIT (OUTPATIENT)
Dept: FAMILY MEDICINE CLINIC | Age: 29
End: 2019-01-09

## 2019-01-09 DIAGNOSIS — F34.1 DYSTHYMIC DISORDER: ICD-10-CM

## 2019-01-09 DIAGNOSIS — F41.1 GENERALIZED ANXIETY DISORDER: Primary | ICD-10-CM

## 2019-01-15 NOTE — PROGRESS NOTES
Initial Assessment     Patient Name: Arun Grace     : 88   Date Completed: 19        Problems Presented Nuzhat Kelley of Present Illness: Pt is 33year old  female presenting for her counseling assessment alongside her 35 year old daughter. Pt presents irritable, worried about job, easily frustrated with daughter, trouble concentrating, attachment miscues, overwhelmed, seeking help for her presentation and desire to become more stable again. PHQ Score 9, symptoms include trouble concentrating, being fidgety; loss of interest; feeling down, irritable and hopeless. Pt reports in addition to the past few weeks, these symptoms have been on and off for several years and include symptoms of anxiety such as feeling keyed up, overly worried, fear that something very bad will happen, fear of failure. Pt endorses hx diagnosis of postpartum depression after the birth of her daughter who is 35 years old. Pt has had psychotropic medication management and counseling in the past which have been helpful. Reports hx of panic attacks, brief assessment of these further agitates pt, will FU in further asst.     Referral Source: Dr. Dunia Bradley:  APPEARANCE ? Clean  ? Neat  ? Unkempt  ? Disheveled     LOOKS STATED AGE ? Yes ? No ? Younger ? Older   EYE CONTACT: ? Appropriate ? Staring ? Avoidant ? Varied ? Erratic   DEMEANOR   ? Apathetic  ? Arrogant  ? Boastful  ? Cold  ? Cooperative  ? Covert ? Demanding  ? Dramatic  ? Evasive ? Friendly ? Hostile  ? Irritable ? Seductive  ? Self-Depreciating  ? Guarded  ? Forthcoming   THOUGHT PROCESS ? Logical  ? Illogical   ? Delusional   ? Hallucinating (visual, auditory, tactile)   ? Paranoid   ? Ruminative  ? Intact     ? Derailed thinking   ? Loose Association    ? Blocking   SPEECH ? Clear    ? Slurring    ? Slowed   ? Loud      ? Soft   ? Pressured  ? Excessive   ? Minimal    ? Incoherent   SENSORY DEFICITS ? None   ? Speech  ? Hearing  ?  Vision INTERPERSONAL ? Interactive  ? Intermittently Interactive   ? Guarded  ? Withdrawn  ? Hostile   ORIENTATION ? Time  ? Place  ? Person  ? Situation   ? x4   AFFECT ? Range/Varied/Approp to diagnosis  ? Blunted  ? Labile  ? Constricted   ? Flat   ATTENTION ? Attentive  ? Inattentive   ? Distractible    COGNITIVE PERFORMANCE ? Alert    ? Organized  ? Disorganized     ? Memory Intact  ? Poor Concentration      ? Memory Deficient: ? Short-Term  ? Long-Term    ? Developmental Disability ? Slow Processing    MOOD ? Angry  ? Anxious  ? Ashamed  ? Calm  ? Depressed   ? Euphoric  ? Hyper  ? Suspicious ? Guarded  ? Euthymic  ? Grandiose   MOTIVATION ? Good    ? Fair    ? Poor   INSIGHT ? Good    ? Fair    ? Poor     RISK ASSESSMENT  Suicide  Current Ideation: denied             Current Plan: denied         Current Attempt: none  Previous Ideation: denied            Previous Plan: denied       Previous Attempt: denied  Homicide  Current Ideation: denied              Current Plan: denied          Current Attempt: none  Previous Ideation: denied            Previous Plan: denied          Previous Attempt: denied  Previous Completion: denied  Destruction of Property  Current Ideation: denied              Current Plan: denied          Current Attempt: none  Previous Ideation: denied            Previous Plan: denied          Previous Attempt: denied  Previous Completion: denied    MENTAL HEALTH TREATMENT HISTORY  Pt reports having gone to psychiatrists and therapist in the past but not being consistent. Pt distracted by daughter in session today and unable to remember where she was getting services in the past. Reports having had several episodes of both. FAMILY HISTORY  Daughter is from another relationship, neither pt or daughter have contact with father. Pt is vague about family of origin possibly due to managing very busy daughter in session or due to being guarded re what she says in front of daughter.  Pt. family lives in [place]. She does go to visit them on occasion. Pt is headed to [place] this week to visit family who is chronically ill, daughter will be staying with pt parents. Daughter just completed pre-school program where she did very well, this also provided pt with respite. Learning difficulties: Undetermined          Mental/Emotional Disorder: Undetermined         Alcohol Disorder: Undetermined         Chemical Disorder:   Undetermined           Fathers hx: Undetermined         Mothers hx: Undetermined           SOCIAL HISTORY   Pt. is not from this area, has lived here 2 years, is from [address] [place] [address], lives with boyfriend and young daughter. Lives half mile away from this office in an apartment. Denies having any real friends here. 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 Current close friend has mistreated her in a business deal they signed together. This business deal may result in pt quitting her job due to stress there and termination friendship. Pt struggles with insight re boundaries - friends and business, knowing this woman had unethical business practices before signing agreement, was hoping it would not happen to her. Pt reports that current boss was a friend, became her boss, paid for a training school, expected her to attend certain days/hours to do classes, pt decided to tell boss that she needed to [facility] on herself and her mental health, boss initially supported this but has since been acting opposite of agreements made and pt feels she is no longer able to function in unpredictable environment. Pt reports her boyfriend is very supportive, positive, sees her daughter as his own, treats her this way. He works out of town and is often gone during the week.  This leaves pt to care for daughter alone, postpartum has mostly resolved but pt reports doing this gets frustrating and was not what I planned for my life as she points to daughter whos bx is WNL for age, but very overwhelming to mom today. Pt reports when she is eating a certain diet (was vegan after pregnancy and lost 15 lbs), has a strict exercise regime, has a job she likes, is on her meds and is in counseling, she is at her best. Without being almost precise with that regime, her overall health declines to this point. CPS/APS Issues: Denied    LEISURE/RECREATION  Physical Limits: None  Exercise: Rigorously- Multiple times daily  Enjoys outdoors, self-help activities, nature, classes re exercise and movement. Used to attend more since pregnancy, has gotten busy with daughter. Completed some school which decreased rec and leisure, struggles to strike a positive balance. TRAUMA HISTORY  Undetermined, will further assess. EDUCATIONAL/WORK HISTORY  Completed [employer] workout training course, was working on finishing certification, may quit this job today. Prior some schooling in college. Will further assess, mom very frustrated, irritable, distracted with daughter in session today coupled with anxiety. DAILY SCHEDULE/SOMATIC FUNCTIONING:  Will further assess, mom very frustrated, irritable, distracted with daughter in session today coupled with anxiety. Sleep:   Normal Very Sound  Restless Nightmares   Bedtime:  Waking Time:  Avg. # of Hours:  Security Items:  Eating Habits Last 2 Weeks:  History of Eating Disorders:      MEDICAL HISTORY  Medical Issues: Hx of breast augmentation, , leep procedure in cervix, depression, anxiety, hypothryoid. Current Medications: prescribed by Dr. Thea Cook  sertraline (ZOLOFT) 50 mg tablet   busPIRone (BUSPAR) 15 mg tablet   ALPRAZolam (XANAX) 0.25 mg tablet   norethindrone-e estradiol-iron (LOESTRIN FE) 1 mg-20 mcg (24)/75 mg (4) tab     Allergies: None Reported  PCP: Dr. Thea Cook Last Visit: 19   Specialists/Other: None at this time    SUBSTANCE USE   Pt is former tobacco smoker, not current.  Denies current use of illegial substances, has occasional caffeine. Request discussion of substance hx when daughter is not present. Reports used to self-medicate and drink too much, no drinking currently. Will FU. ASSESSMENT/DIAGNOSIS  Diagnosis:   F41.0 Generalized Anxiety Disorder, with panic attacks  F34.1 Persistent Depressive Disroder    Discussion: Panic and anxiety reportedly last 12 months, last 8 months increasing, last 30 days worst in last year per PCP assessment and in session today. MDD criteria met x+2 years, manic symptoms denied in assessment, increased ability to manage but lack of resolution. If diagnosis of Major Depression is being considered, Bipolar Disorder must be ruled out. Is depression diagnosis being considered? Yes, PDD  If no, N/A to all below. Has the client ever had a manic episode (a distinct period of abnormally and persistently elevated, expansive, or irritable mood) lasting at least one week? If yes, how many  0   over what period of time ? Never per patient report. Check all symptoms evidenced in any manic episodes (at least 3 are necessary for diagnosis)  Inflated self esteem or grandiosity   Decreased need for sleep   More talkative than usual or pressure to keep talking   Flight of ideas or subjective experience that thoughts are racing  Distractibility   Increase in goal directed activity or psychomotor agitation  Excessive involvement in pleasurable activities that have a high potential for painful consequences    TREATMENT PLAN  The progress of these goals will be measured by patient report, PCP report, parent report, therapist observation. The total number of sessions expected is as needed. Frequency will be weekly upon pt return from [place]. Treatment goals include:  Follow up on assessment per items noted above to further create plan, Increase Prosocial Coping Skills, Maintain Psychotropic Medication Regime, Psychoeducation, Community Referrals as needed, Collaboration with Care Team. The patient agrees to the recommendations. ADDITIONAL RECOMMENDATIONS:  Assist in plan to have sessions at time when daughter has care, if possible. FU on assessment as noted throughout above.   Complete Patient Stress Questionnaire

## 2019-01-16 ENCOUNTER — OFFICE VISIT (OUTPATIENT)
Dept: FAMILY MEDICINE CLINIC | Age: 29
End: 2019-01-16

## 2019-01-16 DIAGNOSIS — F41.1 GENERALIZED ANXIETY DISORDER: ICD-10-CM

## 2019-01-16 DIAGNOSIS — F31.13 SEVERE MANIC BIPOLAR I DISORDER WITHOUT PSYCHOTIC FEATURES (HCC): Primary | ICD-10-CM

## 2019-01-16 NOTE — PROGRESS NOTES
SESSION LENGTH: 55 minutes 20223 to complete asst started in last session    PARTICIPANTS: Latesha    SUBJECTIVE: (theme of session: patient observations, thoughts, direct quotes, symptoms reported)    Pt arrives on time for appt, alone today which allows for focus and further assessment. For a period of the last 2 weeks pt reports the following symptoms:     Spending +$1000 to end contract with prior employer before knowing final amount due- straining family financial resources;    Being awake several nights without sleep cleaning, taking care of things- goal oriented with excessive energy;    Talking a lot, without allowing interruption/conversation;    Being very fidgety- starting to feel anxiety and thinking myself into a panic attack;    Lots of pacing with excessive energy and racing thoughts;   Being up all night texting people and emailing them because I HAD to tell them everything I thought (after lengthy process of quitting job)- then later when she felt like I wasnt a hyper maniac frantically typing until 5am! I was totally embarrassed and freaked out because I was so ashamed and know it was totally irrational, but I couldnt help it. Then I wanted to just move away and run away. Silvia Case Making plans to do far more tasks than humanly possible- starting some toward completion, giving up before starting  list multiple times, feeling goal directed but overwhelmed;    In the last 12 months- has changed jobs and locations of residence multiple times- reports today she almost did again last week when she quit her job, she knows its not healthy but it took her almost 48 hours to back off the plans to do so;    Her bf told her I love you and I think youre bipolar- I cant keep going through these drastic job changes and wild career moves;    Significant difficulty concentrating, making decisions, attending to tasks, executive functioning impairment;   All my thoughts feel like they are racing and I sometimes dont even know if I have complete thoughts, it feels like there are so many things on my mind that I cant even have a complete thought. Krista Francois Has been working out most of waking hours during day last 4 days, I feel like the only way I can calm down sometimes is to wear myself out working out. Evangelina Tuttle Daughter is with maternal grandparents until next week so that she can [employer] and [facility] and get myself together because I want to be a better mom and have a great life with my partner Dev and Clent Dears but I keep screwing it all up and this has to stop. I am really ready to get this under control. . In addition to symptoms reported above, pt reports the following information historical information:     Biological relatives on maternal and paternal side of family, who also have these symptoms, have not had very successful lives and have never been diagnosed to her knowledge.  She has had at least 2 psychiatric hospitalizations since she was a teenager, during both episodes she was diagnosed with bipolar, has been fearful of stigma of diagnosis and was given 7 prescriptions as a teenager then sent overseas [place] study abroad when her symptoms became all out of whack and caused her to end her study abroad early. Teenage onset of kamran.  Above symptoms have come and gone so much in my life its a disaster. reports losing relationships, not keeping a job, not being able to support herself.  That she has done a lot of embarrassing stuff and dangerous stuff in my life when I am all out of whack that she did not want to disclose out of embarrassment.     Reports that she has gone on and off medication so many times because she has been told she has bipolar by some, and only anxiety by some providers- feeling confused because she has never been consistently diagnosed, has been on a ton of different meds but no one seems to be able to get it right, so I give up and then start this whole train wreck over again..   Trena Arteaga life has all the ingredients right now to be amazing, but my head and whatever this is, is taking over and I am so tired of living this way.    Grandiose and Inflated Self Esteem/Capabilities during manic episodes, embarrassed, shameful, self-depreciating after.  Major Depressive Episodes characterized by loss of interest, fatigue, hopelessness, isolation, panic/anxiety, thoughts of self-harm (by hx), problems with follow through/concentration/ADLs for self and daughter. OBJECTIVE: (MSE, Screening/Asst Measures, Hx info, Meds, Bx Observations)  Eyes wide open, talking fast, pressured speech- difficulty engaging in mindfulness- wants to and tries but reports her head wont let me chill out; able to engage in visualization activity of thoughts in bubbles going out of top of head- telling her head that we are supposed to think, and putting the thoughts in the bubbles and watching them go out the top of her head like soda fizz rising out of a cup- add to this gentle stretching of body to replace tense body, she is unable to attend to mindfulness activity due to lacey but states she wants to try it when she can. Pt reports by history the one time in my life I can remember when I had this thing beat was when I was on Lexapro and Abilify back in 2015 for about a year. My life was actually starting to go well for maybe the first time. Then one day I went to the pharmacy to  my medicine and they told me that my Abilify wasnt covered anymore and that it was going to be almost $400 for both my prescriptions. , reports going off both meds and cycling on and off ever since. Reports parents, loved ones, friends alienate her because of her manic episodes.      In addition to clinical interview, completed with results:  MDQ Result: Positive Screen for Bipolar Disorder;  CIDI Bipolar Disorder Screening Scale Result: Very High Risk  Level-2 DSM 5 Lacey Adult Nestor Self-Rating Lacey Scales Result: 22/25 High Probability of Manic or Hypomanic     Medication Changes? ? No  ? Yes    MENTAL STATUS EXAM:  INTERPERSONAL ? Interactive  ? Intermittently Interactive  ? Guarded  ? Withdrawn  ? Hostile   ORIENTATION ? Time  ? Place  ? Person  ? Situation   ? x4   AFFECT ? Appropriate  ? Blunted  ? Labile  ? Constricted   ? Cycling     ATTENTION ? Attentive  ? Inattentive   ? Distractible    COGNITIVE PERFORMANCE ? Alert    ? Organized  ? Disorganized   ? Poor Concentration      ? Memory Deficient: ? Short-Term  ? Long-Term    ? Developmental Disability  ? Slow Processing    MOOD ? Angry  ? Anxious  ? Ashamed  ? Calm   ? Depressed   ? Euphoric  ? Overwhelmed  ? Suspicious ? Guarded  ? Euthymic  ? Manic   MOTIVATION ? Good    ? Fair    ? Poor   INSIGHT ? Good    ? Fair    ? Poor     Risk Assessment: Patient denies current suicidal and homicidal thoughts, intent, actions or plans. Pt reports history of suicide attempt a long time ago resulting in inpatient psych. Due to Very High Risk of SI in patients with bipolar that is not well managed, a safety plan was completed with pt. Copy to her, copy in scan box. ASSESSMENT: (assessment of S/O, content of session, intervention, patient response to intervention, progress in goals)    Pt currently experiencing manic episode transitioning into hypomanic episode, which is following recent full manic episode that lasted +/- 14 days. Safety Plan in place due to risk. Diagnostic impression of F31.13  Bipolar I Disorder, Current or Most Recent Episode Manic- Severe, WITHOUT PSYCHOTIC FEATURES     Added to pt diagnoses- based on clinical interview, presenting and historical symptoms as well as standardized assessment measures. Pt does experience significant comorbid anxiety which is exacerbated by unmanaged symptoms of kamran.      Goal Revision: ? No  ? Yes    Goals:    FU with PCP Philip de jesus success using Abilify to manage manic episodes (NOTE: By hx, Abilify was used in conjunction with SSRI and pt is currently on SSRI).  Provide PCP Philip copies of assessment instruments completed;      Request that PCP/ Team consider contacting pt by Friday or sooner (next 2 days) for addition of psychotropic med Abilify ( or other per physician) to start mood stabilization as soon as possible.  If PCP Philip able to prescribe now, FU re continued psychiatry or if she would prefer referral out.  If PCP Philip unable to prescribe Ability or psychotropic medication for kamran other referrals will be explored and I will contact pt on or before Friday. The MDQ was developed by a team of psychiatrists, researchers and consumer advocates to address a critical need for timely and accurate diagnosis of bipolar disorder, which can be fatal if left untreated. The questionnaire takes about five minutes to complete, and can provide important insights into diagnosis and treatment. Clinical trials have indicated that the MDQ has a high rate of accuracy; it is able to identify seven out of ten people who have bipolar disorder and screen out nine out of ten people who do not. 1 A recent National DMDA survey revealed that nearly 70% of people with bipolar disorder had received at least one misdiagnosis and many had waited more than 10 years from the onset of their symptoms before receiving a correct diagnosis. National DMDA hopes that the MDQ will shorten this delay and help more people to get the treatment they need, when they need it. The MDQ screens for Bipolar Spectrum Disorder, (which includes Bipolar I, Bipolar II and Bipolar NOS). Wendy Melvin M.D., Jamil Benitez D.S.W.,et.al. Development and Validation of a Screening Instrument for Bipolar Spectrum Disorder: The Mood Disorder Questionnaire. [place] [address] of Psychiatry 157:11 (November 2000) [id number].         Home-Based Work Recommended: ? No  ? Yes ? N/A  - Mindfulness using bubbles exercise from session, playing calming/relaxing instrumental music in the background to assist in regulation, FU with PCP or myself by Friday, access safety plan as needed. Cognitive Behavioral Therapy (CBT):  ? Cognitive [place]                             ? Cognitive Challenging                 ? Cognitive Refocusing       ? Cognitive Reframing                         ? Self-Monitoring                           ? Exploration of Coping Patterns  ? Guided Imagery                                 ? Interactive Feedback                 ? Interpersonal Resolutions    ? Mindfulness Training                        ? Guided Imagery                          ? Relaxation/Deep Breathing  ? Role Play/Behavioral Rehearsal      ? Symptom Management              ? Supportive Reflection  ? Exploration of Emotions/Feelings                ? Exploration of Relationship Patterns          Motivational Interviewing (MI):   ? Reflective Listening                          ? Open-Ended Strategies             ? Affirmations             ? Supportive Statements                   ? Exploring Change                ? Responding to Sustain Talk      ? Encourage Insight                           ? Emphasizing Personal Choice/Self-Empowerment        ? Summarizing                                     ? Eliciting Self-Motiv. Statmts   Exploring: ? Problem Recognition ? Concerns ? Intent to Change  ? Optimism     Behavior Activation (BA):   ? Scheduled Behavior Activities   ? Graded Home-based Assignments      ? Role Play     ? Therapist Modeling    ? Having Back-Up Plans                            ? Specific Problem Solving            ? Skills Training and Education  ? Action/TRAP/TRAHUSAM Emery    Other:  ? Rapport Building                   ? Safety Planning                     ? Further Assessment    ? Structured Problem Solving    ? Communication Skills           ? Social Skills             ? Recreation/Leisure Skills        ?  Review of All Medications     ? Review of Medical Conditions           ? Psychoeducation- Meds         ? Psychoeducation- Other       ? Prevention Services                             ? Community Based Referral              ? Psychotropic Med Referral:   ? PCP    ? Psychiatrist  ? PCP Referral for Physical Health Issue           ? Psychological Testing Referral   ?Other Testing Referral _______    PLAN:  Therapist to FU with PCP for continuity in plan of care. Contact pt by Friday if PCP Is unable. Patient agreed to continue current treatment plan goals outlined above. See patient again in 1 week or sooner as needed. Referrals: ?  No  ? Yes    ? N/A    FU Home-Based Activity Recommended

## 2019-01-16 NOTE — Clinical Note
Dr. Vero Damon, Please take a look at today's note as pt would benefit from intervention as soon as we are able to come up with a plan.  I am here on Friday or can be reached tomorrow while I am at peds if you have any questions/concerns/etc. I really appreciate the work you do with our pts. Yulisa Verdugo

## 2019-01-17 ENCOUNTER — DOCUMENTATION ONLY (OUTPATIENT)
Dept: PEDIATRICS CLINIC | Age: 29
End: 2019-01-17

## 2019-01-18 ENCOUNTER — OFFICE VISIT (OUTPATIENT)
Dept: FAMILY MEDICINE CLINIC | Age: 29
End: 2019-01-18

## 2019-01-18 VITALS
TEMPERATURE: 97.3 F | WEIGHT: 126.2 LBS | DIASTOLIC BLOOD PRESSURE: 66 MMHG | RESPIRATION RATE: 18 BRPM | BODY MASS INDEX: 21.02 KG/M2 | HEIGHT: 65 IN | HEART RATE: 76 BPM | OXYGEN SATURATION: 97 % | SYSTOLIC BLOOD PRESSURE: 107 MMHG

## 2019-01-18 DIAGNOSIS — F31.11 BIPOLAR 1 DISORDER, MANIC, MILD (HCC): ICD-10-CM

## 2019-01-18 DIAGNOSIS — R11.2 NAUSEA VOMITING AND DIARRHEA: Primary | ICD-10-CM

## 2019-01-18 DIAGNOSIS — R19.7 NAUSEA VOMITING AND DIARRHEA: Primary | ICD-10-CM

## 2019-01-18 RX ORDER — ARIPIPRAZOLE 10 MG/1
10 TABLET ORAL DAILY
Qty: 30 TAB | Refills: 1 | Status: SHIPPED | OUTPATIENT
Start: 2019-01-18 | End: 2019-02-22

## 2019-01-18 NOTE — PATIENT INSTRUCTIONS
Bipolar Disorder: Care Instructions  Your Care Instructions    Bipolar disorder is an illness that causes extreme mood changes, from times of very high energy (manic episodes) to times of depression. But many people with bipolar disorder show only the symptoms of depression. These moods may cause problems with your work, school, family life, friendships, and how well you function. This disease is also called manic-depression. There is no cure for bipolar disorder, but it can be helped with medicines. Counseling may also help. It is important to take your medicines exactly as prescribed, even when you feel well. You may need lifelong treatment. Follow-up care is a key part of your treatment and safety. Be sure to make and go to all appointments, and call your doctor if you are having problems. It's also a good idea to know your test results and keep a list of the medicines you take. How can you care for yourself at home? · Be safe with medicines. Take your medicines exactly as prescribed. Do not stop or change a medicine without talking to your doctor first. Robin Norton and your doctor may need to try different combinations of medicines to find what works for you. · Take your medicines on schedule to keep your moods even. When you feel good, you may think that you do not need your medicines. But it is important to keep taking them. · Go to your counseling sessions. Call and talk with your counselor if you can't go to a session or if you don't think the sessions are helping. Do not just stop going. · Get at least 30 minutes of activity on most days of the week. Walking is a good choice. You also may want to do other things, such as running, swimming, or cycling. · Get enough sleep. Keep your room dark and quiet. Try to go to bed at the same time every night. · Eat a healthy diet. This includes whole grains, dairy, fruits, vegetables, and protein. Eat foods from each of these groups. · Try to lower your stress. Manage your time, build a strong support system, and lead a healthy lifestyle. To lower your stress, try physical activity, slow deep breathing, or getting a massage. · Do not use alcohol or illegal drugs. · Learn the early signs of your mood changes. You can then take steps to help yourself feel better. · Ask for help from friends and family when you need it. You may need help with daily chores when you are depressed. When you are manic, you may need support to control your high energy levels. What should you do if someone in your family has bipolar disorder? · Learn about the disease and the signs that it is getting worse. · Remind your family member that you love him or her. · Make a plan with all family members about how to take care of your loved one when his or her symptoms are bad. · Talk about your fears and concerns and those of other family members. Seek counseling if needed. · Do not focus attention only on the person who is in treatment. · Remind yourself that it will take time for changes to occur. · Do not blame yourself for the disease. · Know your legal rights and the legal rights of your family member. Support groups or counselors can help you with this information. · Take care of yourself. Keep up with your own interests, such as your career, hobbies, and friends. Use exercise, positive self-talk, deep breathing, and other relaxing exercises to help lower your stress. · Give yourself time to grieve. You may need to deal with emotions such as anger, fear, and frustration. After you work through your feelings, you will be better able to care for yourself and your family. · If you are having a hard time with your feelings or with your relationship with your family member, talk with a counselor. When should you call for help? Call 911 anytime you think you may need emergency care.  For example, call if:    · You feel like hurting yourself or someone else.     · Someone who has bipolar disorder displays dangerous behavior, and you think the person might hurt himself or herself or someone else.   [facility] your doctor now or seek immediate medical care if:    · You hear voices.     · Someone you know has bipolar disorder and talks about suicide. Keep the numbers for these national suicide hotlines: 8-713-296-TALK (6-928.239.3159) and 2-581-TIDBZGZ (9-748.457.2386). If a suicide threat seems real, with a specific plan and a way to carry it out, stay with the person, or ask someone you trust to stay with the person, until you can get help.     · Someone you know has bipolar disorder and:  ? Starts to give away possessions. ? Is using illegal drugs or drinking alcohol heavily. ? Talks or writes about death, including writing suicide notes or talking about guns, knives, or pills. ? Talks or writes about hurting someone else. ? Starts to spend a lot of time alone. ? Acts very aggressively or suddenly appears calm. ? Talks about beliefs that are not based in reality (delusions).    Watch closely for changes in your health, and be sure to contact your doctor if:    · You cannot go to your counseling sessions. Where can you learn more? Go to http://bunny-lado.info/. Enter K052 in the search box to learn more about \"Bipolar Disorder: Care Instructions. \"  Current as of: September 11, 2018  Content Version: 11.9  © 8677-8913 [employer]. Care instructions adapted under license by [employer] (which disclaims liability or warranty for this information). If you have questions about a medical condition or this instruction, always ask your healthcare professional. [address] any warranty or liability for your use of this information.

## 2019-01-18 NOTE — PROGRESS NOTES
Chief Complaint   Patient presents with    Medication Evaluation     1. Have you been to the ER, urgent care clinic since your last visit? Hospitalized since your last visit? No    2. Have you seen or consulted any other health care providers outside of the [address] [place] [address] since your last visit? Include any pap smears or colon screening.  No       Patient presents in office for follow up and medication adjustment

## 2019-01-18 NOTE — PROGRESS NOTES
[facility]  [place]  [phone]             Date of visit: 1/18/2019   Subjective:      History obtained from:  the patient. Rod Benavides is a 33 y.o. female who presents today for follow up sleep, mood  The diarrhea/vomiting she was having a couple weeks ago resolved  Feels some better, less panicky  However, not herself  When she saw our [facility] counselor earlier this week she was quite manic  She says her mood is fluctuating quickly, even within the same day   We had started her back on sertraline and buspar the week before  Doesn't think the medication put her in a worse place  Was more panicky when she came in here to see me the first time    See counselor's note and mood questionaire  Pt says doing better with that not as impulsive more recently  Still feeling disorganized, hard to focus, not herself  Sleep is not too bad  Denies treating herself with drugs or alcohol  Left her daughter with her parents for a while as she is feeling disorganized and needs to get herself together  Quit her job because she didn't think she could do it like this    Has started cross fit exercise at suggestion of counselor and really enjoys that, will continue    She admits they did diagnose her with bipolar years ago when she was hospitalized. Didn't want to admit to herself that she had that diagnosis. However, after this episode, she is ready. She was on abilify for about a year, stopped it when it ran out. Remembers that working well for her  Patient Active Problem List    Diagnosis Date Noted    Bipolar 1 disorder, manic, mild ([facility] [address] 01/18/2019    Nausea vomiting and diarrhea 01/18/2019     Current Outpatient Medications   Medication Sig Dispense Refill    ARIPiprazole (ABILIFY) 10 mg tablet Take 1 Tab by mouth daily. 30 Tab 1    sertraline (ZOLOFT) 50 mg tablet Take 1 Tab by mouth daily.  30 Tab 1    busPIRone (BUSPAR) 15 mg tablet Take 1 Tab by mouth three (3) times daily. 90 Tab 1    ALPRAZolam (XANAX) 0.25 mg tablet Take 1 Tab by mouth two (2) times daily as needed for Anxiety. Max Daily Amount: 0.5 mg. 10 Tab 0    norethindrone-e estradiol-iron (LOESTRIN FE) 1 mg-20 mcg (24)/75 mg (4) tab Take 1 Tab by mouth daily. 28 Tab 12     No Known Allergies  Past Medical History:   Diagnosis Date    Hypothyroid     Psychiatric disorder     depression, anxiety     Past Surgical History:   Procedure Laterality Date    HX BREAST AUGMENTATION      HX GYN          HX LEEP PROCEDURE       Family History   Problem Relation Age of Onset    Diabetes Mother     Heart Disease Paternal Grandmother     Heart Disease Paternal Grandfather      Social History     Tobacco Use    Smoking status: Former Smoker    Smokeless tobacco: Never Used   Substance Use Topics    Alcohol use: No     Frequency: Never     Comment: used to self-medicate, drank too much      Social History     Social History Narrative    Lives with boyfriend and daughter Manny Leal born in 2016    Really enjoys working out, works part time as         Review of Systems  Psych: denies suicidal ideation  GI: denies nausea, vomiting, or diarrhea      Objective:     Vitals:    19 1658   BP: 107/66   Pulse: 76   Resp: 18   Temp: 97.3 °F (36.3 °C)   TempSrc: Oral   SpO2: 97%   Weight: 126 lb 3.2 oz (57.2 kg)   Height: 5' 5\" (1.651 m)     Body mass index is 21 kg/m². General: stated age, well developed, well nourished and in NAD  Psych: alert and oriented to person, place, time and situation and Speech: appropriate quality, quantity and organization of sentences. Not pressured or tangential.  Mildly fidgety. Focused on feeling better. Assessment/Plan:       ICD-10-CM ICD-9-CM    1. Nausea vomiting and diarrhea R11.2 787.91     R19.7 787.01    2.  Bipolar 1 disorder, manic, mild (HCC) F31.11 296.41 REFERRAL TO PSYCHIATRY        Orders Placed This Encounter    REFERRAL TO PSYCHIATRY    ARIPiprazole (ABILIFY) 10 mg tablet     Thankfully getting her panic symptoms under control helped her n/v/d, that has resolved  She and I agree she is safe, not having risky behaviors this week and no SI. Lacey at this point seems mild enough I think we can get by with just adding abilify and not a mood stabilizer too  [id number] [address] [place] to continue the other meds, they have helped her panicky feelings and she would like to continue them  Encouraged her to keep me closely updated  Will see counselor next Wed  See me on 1/29  Labs and ekg next visit if staying on abilify  Refer to psych but will treat her here until she can get in. Discussed the diagnosis and plan and she expressed understanding. Follow-up Disposition:  Return in about 11 days (around 1/29/2019).     Celso Palacios MD

## 2019-01-23 ENCOUNTER — OFFICE VISIT (OUTPATIENT)
Dept: FAMILY MEDICINE CLINIC | Age: 29
End: 2019-01-23

## 2019-01-23 ENCOUNTER — TELEPHONE (OUTPATIENT)
Dept: FAMILY MEDICINE CLINIC | Age: 29
End: 2019-01-23

## 2019-01-23 DIAGNOSIS — F31.13 SEVERE MANIC BIPOLAR I DISORDER WITHOUT PSYCHOTIC FEATURES (HCC): Primary | ICD-10-CM

## 2019-01-23 NOTE — TELEPHONE ENCOUNTER
PI of Dr Dago Morales ok to cut the abilify in half. --- Message from David Miner MD sent at 1/23/2019  4:25 PM EST -----  Regarding: RE: Abilify Question from Pt  Yes, that is fine. MJ, could you let her know ok to cut med in half for now? Thanks! David Miner MD 1/23/2019 4:25 PM     ----- Message -----  From: Kelvin Davis LCSW  Sent: 1/23/2019   4:06 PM  To: David Miner MD  Subject: Abilify Question from Pt                         Pt is doing [facility] better, she has been feeling overly tired since starting on Abilify, recalls being on 5mg prior, wants to know if she can take 5mg per day until she sees you next week and monitor for hypomanic/manic symptoms until then? I let her know that a nurse would contact her once you were able to follow up on this message. Overall, she is very hopeful and I really appreciate the collaboration.

## 2019-01-23 NOTE — PROGRESS NOTES
SESSION LENGTH: 45 minutes [id number]  PARTICIPANTS: Latesha  SUBJECTIVE: (theme of session: patient observations, thoughts, direct quotes, symptoms reported)  I am feeling very comforted and hopeful about getting stable this time. I was so surprised Dr. Ap Palafox was able to see me so quickly and she is actually working with you to help me. After all these years with so many different mental health people, it is kind of unbelievable. I am so grateful. Med Update- See below. Pt reports discord with brothers, mother, seeing family relationships and boundaries differently, wants to become more healthy, especially sees how brother over-steps her request not to talk about difficult things in his life right now, pondering how to shift boundary, managing feelings of guilt with he and mom, able to identify them as triggers. Verbalizes emerging insight around texting too much with them, verbalizes insight developing around self-care and identifying personal values and small achievable goals to work toward vs [employer] or go home thinking of the past that she is able to identify that ended in failure and unnecessary risk. Contemplating lazy vs goal focused. OBJECTIVE: (MSE, Screening/Asst Measures, Hx info, Meds, Bx Observations)  Much more relaxed body language, verbal language much more baseline and not as rapid/fast, better volume control, eye contact more steady, some hand wringing when self-critical in applying new CBT concepts, desire to apply CBT, difficult to give herself marilu to learn the new way, but able to connect and respond to her body sensations. Pt recalls being on 5mg of Abilify by hx, feels overly tired since starting last week, slept all weekend, feels like a zombie. Denies symptoms of depression, very self-aware, I can tell it is going to work like it did last time, I just think that 10mg is too much to start with because I literally can hardly get out of the bed I am so tired. .  Pt reports she has an appt with Dr. Elvin Rodriguez next week, agree that I will send her a staff message re taking  half the dose, but clear that I am not a doc and cannot authorize medication changes. Pt agrees to this plan. Will send Dr. Elvin Rodriguez a staff message re Abilify 5mg vs 10mg. Medication Changes? ? No  ? Yes- Dr. Elvin Rodriguez added 10mg Abilify last week per our collaboration re patient care. MENTAL STATUS EXAM:  INTERPERSONAL ? Interactive  ? Intermittently Interactive  ? Guarded  ? Withdrawn  ? Hostile   ORIENTATION ? Time  ? Place  ? Person  ? Situation   ? x4   AFFECT ? Appropriate/ Varied  ? Blunted  ? Labile  ? Constricted   ? Flat   ATTENTION ? Attentive  ? Inattentive   ? Distractible    COGNITIVE PERFORMANCE ? Alert - Much Calmer, but eyes appeared to be heavy sedated look   ? Organized  ? Disorganized   ? Poor Concentration      ? Memory Deficient: ? Short-Term  ? Long-Term    ? Developmental Disability  ? Slow Processing    MOOD ? Angry  ? Anxious  ? Ashamed  ? Calm   ? Depressed   ? Euphoric  ? Relaxed  ? Suspicious ? Guarded  ? Euthymic  ? Grandiose   MOTIVATION ? Good    ? Fair    ? Poor   INSIGHT ? Good    ? Fair    ? Poor     Risk Assessment: Patient denies suicidal and homicidal thoughts, intent, actions or plan. ASSESSMENT: (assessment of S/O, content of session, intervention, patient response to intervention, progress in goals)  Pt able to verbalized various states of arousal : baseline, depressed, hypomanic and manic states without using those words and without prior knowledge, demonstrating insight into what she has been experiencing. Some worry/concern over being lazy but able to tolerate cog challenging and refocusing toward healthy perspective. Able to apply cog refocusing to session and contemplate thoughts/behavior/action.  Insight that family relationships have been all/toxic or nothing/ignoring each other- wonders if there is actual a happy medium, open to cog reframing and healthy boundaries psychoed. Recommended Boundaries- How to Say Yes and When to Say No, patient to purchase and begin reading with workbook to complete between sessions for discussion. Pt thoughts still busy toward goal oriented focus however now pt is able to be reflective, open to different cog triangle work and desire to be healthy. Is able to verbalize Just focus on the now and this moment when she is aware she is thinking too much to fast.. Goal Revision: ? No  ? Yes    Treatment goals include: Follow up on assessment per items noted above to further create plan- COMPLETED,  Increase Prosocial Coping Skills- Improving  Improve Relationship Boundaries- NEW  Improve Daily Schedule- NEW  Maintain Psychotropic Medication Regime- Improving  Psychoeducation- Provided  Community Referrals as needed N/A   Collaboration with Care Team Ongoing    ? Home-Based Work Reviewed:   ? No  ? Yes    ? N/A  - Listened to Reiki Music after last session, stated it was very relaxing, helped her to regulate and complete tasks at home vs listening to rap or workout music; Spoke to sig other about process here without over identifying with labels. ? Home-Based Work Recommended: ? No  ? Yes ? N/A  Obtain Boundaries Book, Begin to create daily schedule based on needs/values, Create quick TEXT REPLY message to copy and paste to brother vs sending rapid texts for hours and type out texts if needed and send to herself, not others, Try Insight [address] for Anxiety daily until next session. INTERVENTIONS   Cognitive Behavioral Therapy (CBT):  ? Cognitive [place]                             ? Cognitive Challenging                 ? Cognitive Refocusing       ? Cognitive Reframing                         ? Self-Monitoring                           ? Exploration of Coping Patterns  ? Guided Imagery                                 ? Interactive Feedback                 ?  Interpersonal Resolutions ? Mindfulness Training                        ? Guided Imagery                          ? Relaxation/Deep Breathing  ? Role Play/Behavioral Rehearsal      ? Symptom Management            ? Supportive Reflection  ? Exploration of Emotions/Feelings                ? Exploration of Relationship Patterns          Motivational Interviewing (MI):   ? Reflective Listening                          ? Open-Ended Strategies             ? Affirmations             ? Supportive Statements                   ? Exploring Change                ? Responding to Sustain Talk      ? Encourage Insight                           ? Emphasizing Personal Choice/Self-Empowerment        ? Summarizing                                     ? Eliciting Self-Motiv. Statmts   Exploring: ? Problem Recognition ? Concerns ? Intent to Change  ? Optimism     Behavior Activation (BA):   ? Scheduled Behavior Activities   ? Focused Home-based Assignments for Review (Graded)     ? Role Play     ? Therapist Modeling    ? Having Back-Up Plans                            ? Specific Problem Solving            ? Skills Training and Education  ? Action/TRAP/TRAC Napoleon Sullivan    Other:  ? Rapport Building                   ? Safety Planning                     ? Reviewed Safety Plan     ? Structured Problem Solving          ? Communication Skills          ? Social Skills             ? Recreation/Leisure Skills               ? Review of All Medications  ? Review of Medical Conditions           ? Psychoeducation- Meds                ? Psychoeducation- Other     ? Prevention Services                             ? Community Based Referral              ? Psychotropic Med Referral:   ? PCP- FU re Abilify- sees PCP Philip next week. ? Psychiatrist  ? PCP Referral for Physical Health Issue           ? Psychological Testing Referral   ?Other Testing Referral _______    PLAN:  Patient agreed to continue current treatment plan goals outlined above. See patient again in 1 weeks. Referrals: ? No  ? Yes    ? N/A    FU Home-Based Activity Recommended  FU Handout Provided re Insight Timer and Schedule    Additional Recommendations/Therapist Follow-Up:   Therapist to FU with PCP for continuity in plan of care.

## 2019-01-30 ENCOUNTER — DOCUMENTATION ONLY (OUTPATIENT)
Dept: FAMILY MEDICINE CLINIC | Age: 29
End: 2019-01-30

## 2019-01-30 ENCOUNTER — OFFICE VISIT (OUTPATIENT)
Dept: FAMILY MEDICINE CLINIC | Age: 29
End: 2019-01-30

## 2019-01-30 DIAGNOSIS — F41.1 GENERALIZED ANXIETY DISORDER: ICD-10-CM

## 2019-01-30 DIAGNOSIS — F31.12 BIPOLAR I DISORDER, MOST RECENT EPISODE (OR CURRENT) MANIC, MODERATE (HCC): Primary | ICD-10-CM

## 2019-01-30 NOTE — PROGRESS NOTES
Called pt, got vm, left pt message to contact office. If pt contacts office and I am not available- she was called to re-scheduled her follow up with Dr. Simone Avalos as she was a \"No Show\" yesterday due to thinking her appt was tomorrow. Hai Walsh

## 2019-01-30 NOTE — PROGRESS NOTES
1.30.19    SESSION LENGTH: 30 minutes [id number]    PARTICIPANTS: Pt and 2year old daughter Linda Piña: (theme of session: patient observations, thoughts, direct quotes, symptoms reported)  Pt has ordered boundaries book, got it yesterday, will start reading  Pt received call last week from PCP here, Dr. Molina [place]- cut dose of abilify to 5mg, pt tolerating much better, reports stable moods, no kamran, no depression, anxiety persists  Pt concerned with anxiety without triggers  Pt purchased inspirational journal [employer]BOSSIER go get youre a** together- uses humor- encourages self-monitoring, scheduling, troubleshooting  Pt has friend who does babysitting swaps with to start next week- Astrid home from parents since last weekend  Pt concerned with parenting strategies I notice when I am anxious, she starts acting out more  Pt to look into pt time / to allow for her gym time and allow child to have socialization- was in program prior but stopped- sees benefit- will explore head start programs     OBJECTIVE: (MSE, Screening/Asst Measures, Hx info, Meds, Bx Observations)  Pt anxious, daughter responding with anxiety- both able to regulate and with therapist modeling- showing mother how to not engage in power struggles, beg for compliance, model choices and minimal talking with positive reinforcement which child responds to. Pt Thats a miracle how you do that. . Pt practiced in session and both did well. Pt requested recommendation for parenting book- will give to her next week. Medication Changes? ? No  ? Yes- Abilify cut to 5mg, all others the same    [address]:  INTERPERSONAL ? Interactive  ? Intermittently Interactive  ? Guarded  ? Withdrawn  ? Hostile   ORIENTATION ? Time  ? Place  ? Person  ? Situation   ? x4   AFFECT ? Appropriate  ? Blunted  ? Labile  ? Constricted   ? Flat   ATTENTION ? Attentive  ? Inattentive   ? Distractible    COGNITIVE PERFORMANCE ? Alert    ? Organized  ? Disorganized   ? Poor Concentration      ? Memory Deficient: ? Short-Term  ? Long-Term    ? Developmental Disability  ? Slow Processing    MOOD ? Angry  ? Anxious  ? Ashamed  ? Calm   ? Depressed   ? Euphoric  ? Relaxed  ? Suspicious ? Guarded  ? Euthymic  ? Grandiose   MOTIVATION ? Good    ? Fair    ? Poor   INSIGHT ? Good    ? Fair    ? Poor       Risk Assessment: Patient denies suicidal and homicidal thoughts, intent, actions or plan. ASSESSMENT: (assessment of S/O, content of session, intervention, patient response to intervention, progress in goals)  Pt provided positive reflection for achieving goals of bx homework- she forgot to do mindfulness bailee untiltoday, requested and was provided with resource again- recommended using for nap time and regularly daily with child to build attachment and provide co-regulation- Insight Timer Bailee Mandart for mom- mom to explore for child time as recommended. Provided pt with mindfulness of 5 senses- was too much- using name things in the room where you are to yourself or outloud when anxiety without cause happens- you can also walk slowly and do this until anxiety goes away- psychhoed on mindfulness practices to help with impulsivity- scheduling these practices as important as working out and eating- pt does not want to rely on Xanax- pt wants to maximize her ability and skills first.     Diagnosis update: F31.12 Bipolar I Disorder, Most Recent Manic, Moderate    Goal Revision: XNo  ? Yes    Treatment goals include: Increase Prosocial Coping Skills- Improving  Improve Relationship Boundaries- Progressing- got book  Improve Daily Schedule- Improving   Maintain Psychotropic Medication Regime- Improving  Psychoeducation- Provided  Community Referrals as needed N/A   Collaboration with Care Team Ongoing    ? Home-Based Work Reviewed:   ?  No  ? Yes    ? N/A  -Obtained Boundaries Book, Begin to create daily schedule based on needs/values- completed and progressing, Create quick TEXT REPLY message to copy and paste to brother vs sending rapid texts for hours and type out texts if needed and send to herself- not assessed today,Try Insight Timer Bailee- [employer] Mindfulness for Anxiety daily until next session- forgot - will do before next session    ? Home-Based Work Recommended: ? No  ? Yes ? Continue above- adding mindfulness naming items when anxious without triggers. INTERVENTIONS   Cognitive Behavioral Therapy (CBT):  ? Cognitive [place]                             ? Cognitive Challenging                 ? Cognitive Refocusing       ? Cognitive Reframing                         ? Self-Monitoring                           ? Exploration of Coping Patterns  ? Guided Imagery                                 ? Interactive Feedback                 ? Interpersonal Resolutions    ? Mindfulness Training                        ? Guided Imagery                          ? Relaxation/Deep Breathing  ? Role Play/Behavioral Rehearsal      ? Symptom Management            ? Supportive Reflection  ? Exploration of Emotions/Feelings                ? Exploration of Relationship Patterns          Motivational Interviewing (MI):   ? Reflective Listening                          ? Open-Ended Strategies             ? Affirmations             ? Supportive Statements                   ? Exploring Change                ? Responding to Sustain Talk      ? Encourage Insight                           ? Emphasizing Personal Choice/Self-Empowerment        ? Summarizing                                     ? Eliciting Self-Motiv. Statmts   Exploring: ? Problem Recognition ? Concerns ? Intent to Change  ? Optimism     Behavior Activation (BA):   ? Scheduled Behavior Activities   ? Graded Home-based Assignments      ? Role Play     ? Therapist Modeling    ? Having Back-Up Plans                            ? Specific Problem Solving            ?  Skills Training and Education  ? Action/TRAP/TRAC Unk Hands    Other:  ? Rapport Building                   ? Safety Planning                     ? Reviewed Safety Plan     ? Structured Problem Solving          ? Communication Skills          ? Social Skills             ? Recreation/Leisure Skills               ? Review of All Medications  ? Review of Medical Conditions           ? Psychoeducation- Meds                ? Psychoeducation- Other     ? Prevention Services                             ? Community Based Referral              ? Psychotropic Med Referral:   ? PCP    ? Psychiatrist  ? PCP Referral for Physical Health Issue           ? Psychological Testing Referral   ?Other Testing Referral _______    PLAN:  Patient agreed to continue current treatment plan goals outlined above. See patient again in   1    weeks. Referrals: ? No  ? Yes    ? N/A    FU Home-Based Activity Recommended    Additional Recommendations/Therapist Follow-Up:   Therapist to FU with PCP for continuity in plan of care.

## 2019-02-14 ENCOUNTER — HOSPITAL ENCOUNTER (OUTPATIENT)
Dept: LAB | Age: 29
Discharge: HOME | End: 2019-02-14
Payer: MEDICAID

## 2019-02-14 ENCOUNTER — OFFICE VISIT (OUTPATIENT)
Dept: FAMILY MEDICINE CLINIC | Age: 29
End: 2019-02-14

## 2019-02-14 VITALS
RESPIRATION RATE: 18 BRPM | OXYGEN SATURATION: 98 % | HEIGHT: 65 IN | BODY MASS INDEX: 21.56 KG/M2 | SYSTOLIC BLOOD PRESSURE: 124 MMHG | HEART RATE: 76 BPM | DIASTOLIC BLOOD PRESSURE: 84 MMHG | TEMPERATURE: 97.6 F | WEIGHT: 129.4 LBS

## 2019-02-14 DIAGNOSIS — Z51.81 MEDICATION MONITORING ENCOUNTER: ICD-10-CM

## 2019-02-14 DIAGNOSIS — Z12.4 CERVICAL CANCER SCREENING: ICD-10-CM

## 2019-02-14 DIAGNOSIS — R19.7 NAUSEA VOMITING AND DIARRHEA: ICD-10-CM

## 2019-02-14 DIAGNOSIS — R63.5 WEIGHT GAIN: Primary | ICD-10-CM

## 2019-02-14 DIAGNOSIS — R11.2 NAUSEA VOMITING AND DIARRHEA: ICD-10-CM

## 2019-02-14 DIAGNOSIS — F31.11 BIPOLAR 1 DISORDER, MANIC, MILD (HCC): ICD-10-CM

## 2019-02-14 DIAGNOSIS — Z00.00 PHYSICAL EXAM: ICD-10-CM

## 2019-02-14 PROCEDURE — 88175 CYTOPATH C/V AUTO FLUID REDO: CPT

## 2019-02-14 PROCEDURE — 87624 HPV HI-RISK TYP POOLED RSLT: CPT

## 2019-02-14 RX ORDER — BUSPIRONE HYDROCHLORIDE 15 MG/1
15 TABLET ORAL
Qty: 90 TAB | Refills: 1 | Status: SHIPPED | OUTPATIENT
Start: 2019-02-14

## 2019-02-14 RX ORDER — FLUOXETINE HYDROCHLORIDE 20 MG/1
20 CAPSULE ORAL DAILY
Qty: 30 CAP | Refills: 6 | Status: SHIPPED | OUTPATIENT
Start: 2019-02-14

## 2019-02-14 NOTE — PATIENT INSTRUCTIONS
Bipolar Disorder: Care Instructions  Your Care Instructions    Bipolar disorder is an illness that causes extreme mood changes, from times of very high energy (manic episodes) to times of depression. But many people with bipolar disorder show only the symptoms of depression. These moods may cause problems with your work, school, family life, friendships, and how well you function. This disease is also called manic-depression. There is no cure for bipolar disorder, but it can be helped with medicines. Counseling may also help. It is important to take your medicines exactly as prescribed, even when you feel well. You may need lifelong treatment. Follow-up care is a key part of your treatment and safety. Be sure to make and go to all appointments, and call your doctor if you are having problems. It's also a good idea to know your test results and keep a list of the medicines you take. How can you care for yourself at home? · Be safe with medicines. Take your medicines exactly as prescribed. Do not stop or change a medicine without talking to your doctor first. Rolf Escalante and your doctor may need to try different combinations of medicines to find what works for you. · Take your medicines on schedule to keep your moods even. When you feel good, you may think that you do not need your medicines. But it is important to keep taking them. · Go to your counseling sessions. Call and talk with your counselor if you can't go to a session or if you don't think the sessions are helping. Do not just stop going. · Get at least 30 minutes of activity on most days of the week. Walking is a good choice. You also may want to do other things, such as running, swimming, or cycling. · Get enough sleep. Keep your room dark and quiet. Try to go to bed at the same time every night. · Eat a healthy diet. This includes whole grains, dairy, fruits, vegetables, and protein. Eat foods from each of these groups. · Try to lower your stress. Manage your time, build a strong support system, and lead a healthy lifestyle. To lower your stress, try physical activity, slow deep breathing, or getting a massage. · Do not use alcohol or illegal drugs. · Learn the early signs of your mood changes. You can then take steps to help yourself feel better. · Ask for help from friends and family when you need it. You may need help with daily chores when you are depressed. When you are manic, you may need support to control your high energy levels. What should you do if someone in your family has bipolar disorder? · Learn about the disease and the signs that it is getting worse. · Remind your family member that you love him or her. · Make a plan with all family members about how to take care of your loved one when his or her symptoms are bad. · Talk about your fears and concerns and those of other family members. Seek counseling if needed. · Do not focus attention only on the person who is in treatment. · Remind yourself that it will take time for changes to occur. · Do not blame yourself for the disease. · Know your legal rights and the legal rights of your family member. Support groups or counselors can help you with this information. · Take care of yourself. Keep up with your own interests, such as your career, hobbies, and friends. Use exercise, positive self-talk, deep breathing, and other relaxing exercises to help lower your stress. · Give yourself time to grieve. You may need to deal with emotions such as anger, fear, and frustration. After you work through your feelings, you will be better able to care for yourself and your family. · If you are having a hard time with your feelings or with your relationship with your family member, talk with a counselor. When should you call for help? Call 911 anytime you think you may need emergency care.  For example, call if:    · You feel like hurting yourself or someone else.     · Someone who has bipolar disorder displays dangerous behavior, and you think the person might hurt himself or herself or someone else.   [facility] your doctor now or seek immediate medical care if:    · You hear voices.     · Someone you know has bipolar disorder and talks about suicide. Keep the numbers for these national suicide hotlines: 4-802-431-TALK (5-763.917.5315) and 3-243-KNJJJBD (3-854.787.7088). If a suicide threat seems real, with a specific plan and a way to carry it out, stay with the person, or ask someone you trust to stay with the person, until you can get help.     · Someone you know has bipolar disorder and:  ? Starts to give away possessions. ? Is using illegal drugs or drinking alcohol heavily. ? Talks or writes about death, including writing suicide notes or talking about guns, knives, or pills. ? Talks or writes about hurting someone else. ? Starts to spend a lot of time alone. ? Acts very aggressively or suddenly appears calm. ? Talks about beliefs that are not based in reality (delusions).    Watch closely for changes in your health, and be sure to contact your doctor if:    · You cannot go to your counseling sessions. Where can you learn more? Go to http://bunny-aldo.info/. Enter K052 in the search box to learn more about \"Bipolar Disorder: Care Instructions. \"  Current as of: September 11, 2018  Content Version: 11.9  © 2025-1525 [employer]. Care instructions adapted under license by [employer] (which disclaims liability or warranty for this information). If you have questions about a medical condition or this instruction, always ask your healthcare professional. [place] [address] any warranty or liability for your use of this information.

## 2019-02-14 NOTE — LETTER
NOTIFICATION RETURN TO WORK / SCHOOL 
 
2/14/2019 11:22 AM 
 
Ms. Mariusz Mar [address] [place] [id number] To Whom It May Concern: 
 
Mariusz Mar is currently under the care of JAMES Jha. She has medical problems we are working on, but she is doing better. She is safe to work without restrictions, other than time. I would recommend she not work more than 32 hours for the next 3 months as we continue to work on her health conditions. If there are questions or concerns please have the patient contact our office. Sincerely, Camacho Adorno MD

## 2019-02-14 NOTE — PROGRESS NOTES
[facility]  [address] [place], [address] [place] [address] Road  [phone]             Date of visit: 2/14/2019   Subjective:      History obtained from:  the patient. Ministerio Valente is a 26 y.o. female who presents today for f/u chronic problems    No more n/v/d  Mood better  More energy to do things like clean (but not too much), just more functional    Doesn't feel depressed  Still anxious but not feeling depressed  Less irritable  Anxious today because she has a job interview  Not havng outbursts    Boyfriend sees a big difference    Went back to 10mg abilify after taking the 5mg for a while  Not so much drowsiness from it now, got used to it  Just doesn't like the weight gain, but otherwise no problems with medicines    The anxiety is still a problem, even though not as bad as before. 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 Replaces zoloft 30 Cap 6    busPIRone (BUSPAR) 15 mg tablet Take 1 Tab by mouth three (3) times daily as needed. 90 Tab 1    ARIPiprazole (ABILIFY) 10 mg tablet Take 1 Tab by mouth daily. 30 Tab 1    ALPRAZolam (XANAX) 0.25 mg tablet Take 1 Tab by mouth two (2) times daily as needed for Anxiety. Max Daily Amount: 0.5 mg. 10 Tab 0    norethindrone-e estradiol-iron (LOESTRIN FE) 1 mg-20 mcg (24)/75 mg (4) tab Take 1 Tab by mouth daily.  28 Tab 12     No Known Allergies  Past Medical History:   Diagnosis Date    Hypothyroid     Psychiatric disorder     depression, anxiety     Past Surgical History:   Procedure Laterality Date    HX BREAST AUGMENTATION  HX GYN          HX LEEP PROCEDURE       Family History   Problem Relation Age of Onset    Diabetes Mother     Heart Disease Paternal Grandmother     Heart Disease Paternal Grandfather      Social History     Tobacco Use    Smoking status: Former Smoker    Smokeless tobacco: Never Used   Substance Use Topics    Alcohol use: No     Frequency: Never     Comment: used to self-medicate, drank too much      Social History     Social History Narrative    Lives with boyfriend and daughter Mino Ortega born in 2016    Really enjoys working out, works part time as         Review of Systems  Gen: denies fever   Psych: denies suicidal ideation        Objective:     Vitals:    19 1019   BP: 124/84   Pulse: 76   Resp: 18   Temp: 97.6 °F (36.4 °C)   TempSrc: Oral   SpO2: 98%   Weight: 129 lb 6.4 oz (58.7 kg)   Height: 5' 5\" (1.651 m)     Body mass index is 21.53 kg/m². General: stated age, well developed, well nourished and in NAD  Neck: supple, symmetrical, trachea midline, no adenopathy and thyroid: not enlarged, symmetric, no tenderness/mass/nodules  Lungs:  clear to auscultation w/o rales, rhonchi, wheezes w/normal effort and no use of accessory muscles of respiration   Heart: regular rate and rhythm, S1, S2 normal, no murmur, click, rub or gallop  Abdomen: soft, nontender, no masses  Ext:  No edema noted. Lymph: no cervical adenopathy appreciated  Skin:  Normal. and no rash or abnormalities   Psych: alert and oriented to person, place, time and situation and Speech: appropriate quality, quantity and organization of sentences  Gyn: normal female externally, cervix and vagina without lesions or discharge, no cervical motion tenderness, no adnexal masses or tenderness     EKG: normal SR, QTc 415    Assessment/Plan:       ICD-10-CM ICD-9-CM    1. Weight gain R63.5 783.1    2. Nausea vomiting and diarrhea R11.2 787.91     R19.7 787.01    3.  Bipolar 1 disorder, manic, mild (HCC) F31.11 296.41    4. Medication monitoring encounter Z51.81 V58.83 CBC WITH AUTOMATED DIFF      METABOLIC PANEL, COMPREHENSIVE      LIPID PANEL   5. Cervical cancer screening Z12.4 V76.2 PAP IG, APTIMA HPV AND RFX 16/18,45 ([id number])   6. Physical exam Z00.00 V70.9 AMB POC EKG ROUTINE W/ 12 LEADS, INTER & REP        Orders Placed This Encounter    CBC WITH AUTOMATED DIFF    METABOLIC PANEL, COMPREHENSIVE    LIPID PANEL    AMB POC EKG ROUTINE W/ 12 LEADS, INTER & REP    FLUoxetine (PROZAC) 20 mg capsule    busPIRone (BUSPAR) 15 mg tablet    PAP IG, APTIMA HPV AND RFX 16/18,45 ([id number])       Overall doing much better  The abilify is working great but seems to be causing her significant weight gain  May consider change to geodon next month but I think now a bit premature  Would like for her to see psychiatry, gave her their contact info again she had lost, she plans to call for appt  Told her I would help her until she could see them  Labs, EKG today for abilify monitoring  Change her zoloft to prozac to see if that helps with the weight  Also still need to get anxiety under more control  Advised she could use the buspar more often for anxiety if needed; has only been using once daily  She really thinks the therapy is helping, as well  encouraged continued exercise, healthy eating    Discussed the diagnosis and plan and she expressed understanding. Follow-up Disposition:  Return in about 4 weeks (around 3/14/2019) for Follow up.     Gladys Rojas MD

## 2019-02-14 NOTE — PROGRESS NOTES
1. Have you been to the ER, urgent care clinic since your last visit? Hospitalized since your last visit? No    2. Have you seen or consulted any other health care providers outside of the [address] [place] [address] since your last visit? Include any pap smears or colon screening.  No

## 2019-02-19 NOTE — PROGRESS NOTES
Sent in letter: 
Pap test was normal and with negative testing for HPV (the virus that causes cervical cancer). This means you can wait 5 years to have another pap smear! I would still recommend an annual physical. I hope you are feeling well, and please call if you have any questions.

## 2019-02-22 ENCOUNTER — TELEPHONE (OUTPATIENT)
Dept: FAMILY MEDICINE CLINIC | Age: 29
End: 2019-02-22

## 2019-02-22 NOTE — TELEPHONE ENCOUNTER
LM that she can transfer the med to [place] and get it for $ for 30 day supply or for $10 for 90 day. Or she can call her insurance co to find out what their preferred med is and we would need to change to that one. PC back to say that she was talking about abilify not fluoxetine. PI that she is going to need to call her insurance co to find their preferred med in place of the abilify. She said that she has used some other meds in the past but couldn't handle the s/e. Advised that I am going to need to know that names of them and when she used them and what s/e she had. PC states that her insurance will cover latuda without a prior auth.   She wanted to report that she couldn't tolerate seroquel or risperdal.

## 2019-02-22 NOTE — TELEPHONE ENCOUNTER
Will try latuda, I would like for her to keep us posted on how she is feeling or let Lamar Argueta know

## 2019-02-22 NOTE — TELEPHONE ENCOUNTER
Pt. is calling regards to prior authorization for her Fluoxetine. She said she needs to get this meds today and pharm will not give it to her without prior auth.      Best call #[phone]

## 2019-02-27 ENCOUNTER — DOCUMENTATION ONLY (OUTPATIENT)
Dept: FAMILY MEDICINE CLINIC | Age: 29
End: 2019-02-27

## 2019-02-27 NOTE — PROGRESS NOTES
Pt had appt with me scheduled yesterday, called yesterday and rescheduled to today. Pt no show for 430pm appt, it is now 4:48pm.  
 
Reviewed pt EMR and noted that Dr. Royal Ortega saw pt recently and is referring pt to psychiatrist for med mgmt- has provided pt with referral info x2. If pt does not return or reschedule with me, I will try to reach out to her again via phone and/or mail her a letter encouraging follow up with me as well as referrals in the community for counseling and/or psychiatry should she want to go that route.

## 2019-03-07 ENCOUNTER — TELEPHONE (OUTPATIENT)
Dept: FAMILY MEDICINE CLINIC | Age: 29
End: 2019-03-07

## 2019-03-07 NOTE — TELEPHONE ENCOUNTER
Called pt and she said that she is on day 3 of her new job. Yesterday she did ok, last night she didn't sleep(except a very short time). She just had a lot of anxiety she has been taking the new meds as directed. latuda 20 mg, Prozac 20 mg and the buspar taking prn. She took it before bed last night. Today she took one in the am, 1/2 tab at break time. 1/2 tab @ lunch. She is doing fine today considering she didn't get much sleep. She wonders if she needs to change the meds around. She is concerned about repeating last nights not being able to sleep. She didn't take a xanax last night she is saving that for severe anxiety. She did take a sprint around the neighborhood after work and trying to implement what Marivel Grullon and her have been talking about. Let her know that I would check with Dr Alisa Wolff for her rec and get back with her.

## 2019-03-07 NOTE — TELEPHONE ENCOUNTER
----- Message from Mervat Sullivan sent at 3/7/2019 12:54 PM EST -----  Regarding: /Telephone   Pt requesting a call back regarding discussing higher dose for Rx\"Prozac (unsure of mg)\", Rx\"Xanax (unsure of mg)\" , and Rx\"Lutda (unsure of mg)\". Also, pt would like to schedule an appointment with counselor Kristie Lamb. Best contact:[phone]      Outbound call made. No answer. M to return call back to the office to schedule an appointment with Kristie Lamb.       Best callback:[phone]

## 2019-03-08 NOTE — TELEPHONE ENCOUNTER
Left message on Identified VM to call office a [phone]. Today's schedule is booked. Scheduled patient for 4 pm on 3/12/19. Advised patient to call and let us know if this works for her.

## 2019-03-13 ENCOUNTER — OFFICE VISIT (OUTPATIENT)
Dept: FAMILY MEDICINE CLINIC | Age: 29
End: 2019-03-13

## 2019-03-13 DIAGNOSIS — F31.12 BIPOLAR I DISORDER, MOST RECENT EPISODE (OR CURRENT) MANIC, MODERATE (HCC): Primary | ICD-10-CM

## 2019-03-15 NOTE — PROGRESS NOTES
SESSION LENGTH: 90651 60 minutes (plus 60 minutes crisis coding 09644)  PARTICIPANTS:  Latesha  SUBJECTIVE: (theme of session: patient observations, thoughts, direct quotes, symptoms reported)  Pt presents with wide eyes, hair and clothing disheveled, pressured and fast speech, reporting symptoms of hypomania last 24 hours with manic episode onset 72 hours ago. Pt reports being unable to calm her mind, running laps around her apartment complex in the middle of the night, taking out a credit card and charging over $1000. Pt daughter is with her parents in [place] due to presentation. Pt reports weight gain of 15 pounds on Abilify  but when we checked the EMR, she only gained 6.4 pounds,  pt tearful, feeling hopeless, stating that she did well on taking ½ of .5 xanax several days last week for work but that she did not take it prior to recent manic episode due to being afraid that I would run out and lose my job. Pt reports fear that if she asks for more medication from PCP Xanax, she will be denied. Pt reports thoughts of being dead so the anxiety and panic would stop because it keeps sending me into manic eposides. But I dont want to actually kill myself, I just want this cycle to stop tearfully. Pt has started and quit 2 jobs since last being seen; pt has taken her daughter to parents again. OBJECTIVE: (MSE, Screening/Asst Measures, Hx info, Meds, Bx Observations)  See above for addtl bx observations, pt meets criteria for current episode manic, not well managed as medication changed from [place] to [facility] due to her insurance no longer covering [address] Pt reports she and nurse attempted to get abilify filled but it was denied so Dr. Tonya Marquez changed to [facility].  Pt appears to be suffering with psychotropic medication which is not well managed due to 4 psychotropic med changes in 2 months and fear of asking PCP for rescue med and overwhelming fear of failure which pt reports is what kept her from returning to counseling. Medication Changes? ? No  ? Yes From Abilify and Zoloft to [place] and Prozac. MENTAL STATUS EXAM:  APPEARANCE ? Clean  ? Neat  ? Unkempt  ? Disheveled     LOOKS STATED AGE ? Yes ? No ? Younger ? Older   EYE CONTACT: ? Poor ? Good  ? Staring ? Avoidant ? Varied ? Erratic   ORIENTATION  ? Time  ? Place  ? Person  ? Situation   ? x4   DEMEANOR   ? Apathetic  ? Arrogant  ? Boastful  ? Cold   ? Cooperative  ? Covert ? Demanding  ? Dramatic  ? Evasive ? Friendly ? Hostile  ? Irritable ? Seductive  ? Self-Depreciating  ? Guarded  ? Forthcoming   THOUGHT PROCESS ? Normal: logical, tight, linear, coherent, goal directed  ? Abnormal:  ? Circumstantial  ? Tangential ? Loose  ? Flight of Ideas ? Perseveration   ? Word Salad   ? Clanging  ? Thought Blocking          THOUGHT CONTENT ? WNL/Appropriate ? Preoccupations ? Delusions    ? Ideas of Reference ? Derealization  ? Depersonalization   ? Hallucinating (visual, auditory, tactile):    ? Paranoid   ? Ruminative  ? Intact     ? Derailed thinking      SPEECH ? Clear    ? Slurring    ? Slowed   ? Loud      ? Soft  ? Mute  ? Pressured  ? Excessive   ? Minimal    ? Incoherent   SENSORY DEFICITS ? No Change since last MSE   ? Speech  ? Hearing  ? Vision   INTERPERSONAL ? Interactive  ? Intermittently Interactive   ? Guarded  ? Withdrawn  ? Hostile   AFFECT ? Appropriate  ? Full Range ? Restricted  ? Blunted  ? Constricted   ? Flat ? Suspicious ? Guarded  ? Euthymic ? Grandiose  ? Labile   ? Stable  ? Congruent ? Incongruent   ATTENTION ? Attentive  ? Inattentive   ? Distractible    COGNITIVE PERFORMANCE ? Alert  ? Focused ? Organized  ? Disorganized     ? Memory Intact ? Memory Deficient: ? Short-Term  ? Long-Term    ? Developmental Disability ? Slow Processing    MOOD ? Angry  ? Anxious  ? Ashamed  ? Calm  ? Depressed   ? Euphoric  ? Hypomanic ? Sad  ? Elated ? Worried  ? Hopeful     MOTIVATION ? Good    ? Fair    ? Poor   JUDGEMENT ? Good    ? Fair    ? Poor   INSIGHT ? Good    ? Fair    ? Poor   RISK ASSESSMENT   Suicide  Current Ideation: Yes- Completed safety plan- referred pt [facility] [address] [facility] [address] since she has Medicaid now. Reviewed inpatient if symptoms continue without stabilization. Current Plan: denied         Current Attempt: none    Homicide  Current Ideation: denied              Current Plan: denied          Current Attempt: none    Destruction of Property  Current Ideation: denied              Current Plan: denied          Current Attempt: none    ASSESSMENT: (assessment of S/O, content of session, intervention, patient response to intervention, progress in goals)  Pt referred to psychiatrist by PCP and earliest appt is in August/Sept- this is a current trend for patients. Pt concerned re weight, able to tolerate cog challenging and corrective CBT re this- cried; reviewed stable plan to attend tx weekly or at least bi-weekly with [facility] worker to support job; psychotropic med mgmt. discussed, told pt I would staff Xanax for daily use with PCP to support pt anxiety and manic onset due to anxiety with recommendation to have Xanax daily bc pt states she can function at work without fear, anxiety and with success. Pt and I completed a controlled substance contract to present to PCP stating that PCP could consider providing pt with 14 xanax at a time until pt is stable on psychotropic meds which take time to build up in her system to a therapeutic level--  with pt agreement to see me at least bi-weekly and weekly until [facility] worker starts as intake there will take at least 15-20 days for insurance approval.     Completed BA Wkst- See Dr. Candice Malik, PCP here on the 22nd; Keep safety plan with her; utilize [id number] and other resources when hypomanic or significant anxiety; contact [facility] to F/U on [facility] referral; affirmations.     Goal Revision: ? No  ? Yes    Goal Progress Measures: REGRESSSING, MAINTAINING, INCONSISTENT, IMPROVING, MASTERED, COMPLETED, ONGOING, NOT ADDRESSED, NEW GOAL ADDED TODAY    Goals: Increase Prosocial Coping Skills- REGRESSED  Improve Relationship Boundaries- REGRESSED  Improve Daily Schedule- REGRESSED  Maintain Psychotropic Medication Regime- Improving  ATTEND THERAPY WEEKLY/BIWEEKLY as indicated above ADDED TODAY  Psychoeducation- Provided  Community Referrals as needed REFERRED [facility] TODAY   Collaboration with Care Team Ongoing     ? Home-Based Work Reviewed:  x No  Yes    ? N/A      ? Home-Based Work Recommended: ? No  ? Yes ?      TRAUMA INFORMED INTERVENTIONS   Cognitive Behavioral Therapy Techniques (CBT):  Explored/Developed Awareness/Increased Insight:  ? Emotions/Feelings   ? Coping Patterns       ? Relationships  ? Self Esteem   ? Personal Boundaries   ? Cultural Influences            ? Family of Origin Influences    ? Cognitive [place]                         ? Cognitive Challenging           ? Cognitive Refocusing       ? Cognitive Reframing                     ? Self-Monitoring                     ? Supportive Reflection    ? Guided Imagery                             ? Interactive Feedback            ? Interpersonal Resolutions    ? Mindfulness Training                    ? Guided Imagery                     ? Relaxation/Deep Breathing  ? Role Play/Behavioral Rehearsal  ? Symptom Management               Trauma Focused CBT Modules/Techniques (TFCBT):   ? Gradual Exposure/Desensitization  ? Assessment/Engagement  ? PsychoEd   ? Relaxation/Mindfulness    ? Affect Modulation   ? Cognitive Coping  ? Trauma Narrative (Exposure/Cognitive Processing)  ? In Vivo Exposure ? Conjoint Narrative- IF CLINICALLY APPROPRIATE   ? Enhancing Future Safety    Motivational Interviewing (MI):   ? Reflective Listening                ? Open-Ended Strategies       ? Affirmations             ? Supportive Statements         ? Exploring Change                ? Responding to Sustain Talk      ?   Encourage Insight ? Emphasizing Personal Choice/Self-Empowerment        ? Summarizing                           ? Eliciting Self-Motiv. Statmts   Exploring: ? Problem Recognition ? Concerns ? Intent to Change  ? Optimism    Behavior Activation (BA):   ? Sched. Behavior Activities   ? Graded Home-based Assignments      ? Therapist Modeling   - showed pt how to use [id number] since texting is primary choice in safety plan review today           ? Having Back-Up Plans                            ? Specific Problem Solving     ? Skills Training and Education  ? Action/TRAP/TRAC [employer]     ? Role Play        Acceptance and Commitment Therapy Techniques (ACT):   ? Present Moment    ? Diffusion                     ? Acceptance                   ? Self as Context        ? Committed Action    ? Values        ? Psych Flexibility    Other Techniques:  ? Rapport Building                   ? Safety Planning                     ? Reviewed Safety Plan     ? Structured Problem Solving          ? Communication Skills           ? Social Skills             ? Recreation/Leisure Skills               ? Review of All Medications   ? Review of Medical Conditions           ? Psychoeducation- Meds                ? Psychoeducation- Other      ? Prevention Services                             ? Community Based Referral            ?  Play Therapy           ? Art Therapy   ? Psychotropic Med Referral:   ? PCP    ? Psychiatrist  ? PCP Referral for Phy Health Issue         ? Psychological Testing Referral     ?Other        PLAN:  Patient agreed to continue current treatment plan goals outlined above. See patient again in   1-2 weeks depending on [facility] and Appt with Dr. Chasity Felix next week with her work schedule. Referrals: ?  No  ? Yes    ? N/A    FU Home-Based Activity Recommended  FU Handout Provided    Additional Recommendations/Therapist Follow-Up:   Therapist to FU with PCP for continuity in plan of care via CC and face to face - provide PCP with copy of proposed controlled substance plan signed by pt, provide PCP with safety plan; request PCP and/or nurse contact pt by Monday, March 18 (4 days prior to face to face) and touch base to see if pt has enough Xanax --- to remain stable until face to face with PCP on 22nd. Pt left today with 5.5 pills left off of original 10 pill script provided 2.5 months ago demonstrating pt is not misusing medication. F/U with referral to [facility], pt signed DESI's for this purpose.

## 2019-03-20 ENCOUNTER — DOCUMENTATION ONLY (OUTPATIENT)
Dept: FAMILY MEDICINE CLINIC | Age: 29
End: 2019-03-20

## 2019-03-20 NOTE — PROGRESS NOTES
Spoke to pt and advised her of Dr Kamlesh Cuevas note. Pt states that she will be working harder about keeping appts and meds. She doesn't want to go to the ER b/c it doesn't do any good. She is in debt from going to the ER. She is scheduled to see Nai Gordon and Dr Kamlesh Cuevas on Fri and feels like she doesn't have anyone in her corner. I let her know that Dr Hedy Shone in her corner but Dr Kamlesh Cuevas isn't going to prescribe xanax if pt isn't doing her part. She will keep appt as planned.

## 2019-03-22 ENCOUNTER — DOCUMENTATION ONLY (OUTPATIENT)
Dept: FAMILY MEDICINE CLINIC | Age: 29
End: 2019-03-22

## 2019-03-22 NOTE — PROGRESS NOTES
Pt had 11:00am appt to see me this morning. Pt no showed for her appt. Notifying Dr. Meme Rios and MANDY (Lead Nurse) via cc to this charting.

## 2019-03-22 NOTE — PROGRESS NOTES
Pt contacted MANDY (Dr. Micah David Nurse) to discuss her medication regime. MANDY advised that pt return see Dr. Meme Rios and myself face to face to further process and/or follow her recommendation from last week to seek out emergency care through the ER. MANDY came to me to discuss pt call as pt was very upset, demanding and agitated re request for xanax. I let MANDY know that Dr. Meme Rios and I had already spoken about the plan for this pt and that in last weeks session, pt had been connected to [address] [place] [address] which is a community based counseling service that pt qualifies for and that the referral contact reached out to me today reporting that they have called pt daily since last week with no return call. Pt signed DESI last week for referral and was provided handout to self refer- but I went ahead and did the referral as pt has poor follow through and due to acute risk of pt with active unstable manic episodes. Pt never self referred or followed up to date. I let MANDY know that I also provided the referral source ([facility]) an alternate number for the patient. MANDY contacted pt again to review these resources and had to leave a vm including that she would receive an email soon with additional resources. MANDY and I decided to use [employer] to send the pt resources discussed in last weeks session to ensure that pt had resources. Pt was emailed [facility] information again as well as crisis stab information to provide wrap around community support in addition to pts current safety plan. Original email to pt and pt responses are below: 
 
Original email below sent to pt today with Dr. Meme Rios and MANDY [facility]'ed:  
 
From: [place] Sent: Wednesday, March 20, 2019 10:23 AM 
To: [email or web address] Subject: SAFEMAIL KD REFERRALS Importance: [place],  
 
I just spoke to MANDY and have been speaking to Dr. Meme Rios ongoing since I saw you last week. I wanted to send you an email and remind you of the alternate choices we discussed in the last session for services that can help you. Your health insurance covers both of these services. The first one is the community based counselor. The second choice is an immediate response program that will come help you now and does not require you to go inpatient to a hospital for help. You should go ahead and call the second one so they can get that set up for you today as I think they would be a better service for you right now. Then you can call the first one back later this week. First Choice:  
 
Community Counselor: Edgar San let me know this morning that she has been calling you daily and leaving messages but you have not called her back. This is the program we discussed and I gave you the handout for. You can call her today. Nusrat Damico [facility] Director [facility] and [facility] Phone: [phone] Fax: [phone] SECOND CHOICE:  
This is a separate program from the [place], this program will respond ASAP. Her Medicaid will pay for it. Website: http://blueKiwi/  
[facility] is a community-based, short-term mental health service for non-hospitalized individuals of all ages experiencing an acute crisis of a psychiatric nature. The service provides clinical support to those at risk of hospitalization and/or admission to a residential treatment facility. We also mobilize the resources of the community support system, family members, and others for ongoing rehabilitation and recovery. We operate through a trauma-informed approach. Strength-based and person-centered, this approach highlights clients resilience, capacity to problem-solve, and ability to engage in post traumatic growth.  Our clinicians use evidenced-based interventions with emphasis on Cognitive Behavioral Therapy (CBT), Applied Behavior Analysis (SAROJ), and skill development to avert future crises. Services may be provided in the clients home, the home of the clients family or primary caregiver, or other community-based programs licensed by the [address] [phone] Sunita Christianson LCSW, CANDACEP Integrated Behavioral Health Clinician [address] [facility] [address] [facility] [address] *Monday and Thursday*                                       
[address] [facility], [address] [facility] [address], [address] [facility] Main Office: [phone] Direct Line: [phone] Fax [phone] [facility] *Tuesday, Wednesday and Friday*                     
[address] Main Office: [phone] Direct Line: [phone] Fax [phone] Good Help to Those in Need® The information in this communication is intended to be confidential to the Individual(s) and/or Entity to whom it is addressed. It may contain information of a Privileged and/or Confidential nature, which is subject to [place] and/or [facility]. In the event that you are not the intended recipient or the agent of the intended recipient, do not copy or use the information contained within this communication, or allow it to be read, copied or utilized in any manner, by any other person(s). Should this communication be received in error, please notify the sender immediately either by response e-mail or by phone, and permanently delete the original e-mail, attachment(s), and any copies. __________________________________________________ Pt response email: -----Original Message----- From: [email or web address] Sent: Wednesday, March 20, 2019 1:33 PM 
To: Aisha Nichols Subject: #ExtMail# RE: SAFEMAIL KD REFERRALS 
 
WARNING: This email originated from outside of the [employer] system. ** DO NOT CLICK links or open attachments unless you recognize the sender and know the content is safe. ** NEVER provide your User ID or Password. ** 
 
 
 --- Originally sent by [email or web address] on Mar 20, 2019 10:23 AM --- 
 
I have an appt with Rosario Steele on Monday, Ill be meeting with you and Dr. Kurt Hall on Friday though. Thanks! Jereld Laughter 
 
________________________________ Dr. Kurt Hall received email and responded below: 
 
 
From: Umm Martinez Sent: Thursday, March 21, 2019 8:10 AM 
To: Aisha Nichols Subject: RE: [facility] KD REFERRALS Thanks so much for setting her up with these resources!!!